# Patient Record
Sex: FEMALE | Race: WHITE | NOT HISPANIC OR LATINO | Employment: OTHER | URBAN - METROPOLITAN AREA
[De-identification: names, ages, dates, MRNs, and addresses within clinical notes are randomized per-mention and may not be internally consistent; named-entity substitution may affect disease eponyms.]

---

## 2017-01-31 DIAGNOSIS — E11.9 UNCOMPLICATED TYPE 2 DIABETES MELLITUS (H): Primary | ICD-10-CM

## 2017-01-31 NOTE — TELEPHONE ENCOUNTER
lantus solostar pen         Last Written Prescription Date: 9/22/16  Last Fill Quantity: 3, # refills: 0  Last Office Visit with [facility] [place] [place], [facility] or [facility] prescribing provider:  5/16/16        BP Readings from Last 3 Encounters:   05/16/16 138/62   11/03/15 120/70   08/06/15 112/70     MICROL        8   11/3/2015  No results found for this basename: microalbumin  CREATININE   Date Value Ref Range Status   05/16/2016 0.82 0.52 - 1.04 mg/dL Final   ]  GFR ESTIMATE   Date Value Ref Range Status   05/16/2016 71 >60 mL/min/1.7m2 Final     Comment:     Non  GFR Calc   11/03/2015 59* >60 mL/min/1.7m2 Final     Comment:     Non  GFR Calc   05/12/2015 74 >60 mL/min/1.7m2 Final     Comment:     Non  GFR Calc     GFR ESTIMATE IF BLACK   Date Value Ref Range Status   05/16/2016 85 >60 mL/min/1.7m2 Final     Comment:      GFR Calc   11/03/2015 72 >60 mL/min/1.7m2 Final     Comment:      GFR Calc   05/12/2015 89 >60 mL/min/1.7m2 Final     Comment:      GFR Calc     CHOL      161   11/3/2015  HDL       40   11/3/2015  LDL       61   11/3/2015  TRIG      301   11/3/2015  CHOLHDLRATIO      4.0   11/3/2015  AST       10   2/24/2015  ALT       23   2/24/2015  A1C      8.7   5/16/2016  A1C      8.2   11/3/2015  A1C      8.0   8/6/2015  A1C      8.5   5/12/2015  A1C      8.4   2/24/2015  POTASSIUM   Date Value Ref Range Status   05/16/2016 4.3 3.4 - 5.3 mmol/L Final

## 2017-02-01 RX ORDER — INSULIN GLARGINE 100 [IU]/ML
INJECTION, SOLUTION SUBCUTANEOUS
Qty: 3 ML | Refills: 0 | Status: SHIPPED | OUTPATIENT
Start: 2017-02-01 | End: 2017-08-25

## 2017-02-02 NOTE — TELEPHONE ENCOUNTER
Pres[place] per [facility] [place].    Signed Prescriptions:                        Disp   Refills    LANTUS SOLOSTAR 100 UNIT/ML soln           3 mL   0        Si units at bedtime. PLEASE SCHEDULE AN APPOINTMENT           TO RECEIVE FUTURE REFILLS [phone]  Authorizing Provider: MP FUNES  Ordering User: HERI TAVAREZ      Closing encounter - no further actions needed at this time    Heri Tavarez RN

## 2017-08-22 ENCOUNTER — APPOINTMENT (OUTPATIENT)
Dept: GENERAL RADIOLOGY | Facility: CLINIC | Age: 62
End: 2017-08-22
Attending: EMERGENCY MEDICINE
Payer: COMMERCIAL

## 2017-08-22 ENCOUNTER — HOSPITAL ENCOUNTER (EMERGENCY)
Facility: CLINIC | Age: 62
Discharge: HOME | End: 2017-08-23
Attending: EMERGENCY MEDICINE | Admitting: EMERGENCY MEDICINE
Payer: COMMERCIAL

## 2017-08-22 DIAGNOSIS — I31.9 PERICARDITIS, UNSPECIFIED CHRONICITY, UNSPECIFIED TYPE: ICD-10-CM

## 2017-08-22 LAB
ALBUMIN SERPL-MCNC: 3 G/DL (ref 3.4–5)
ALP SERPL-CCNC: 75 U/L (ref 40–150)
ALT SERPL W P-5'-P-CCNC: 19 U/L (ref 0–50)
ANION GAP SERPL CALCULATED.3IONS-SCNC: 12 MMOL/L (ref 3–14)
AST SERPL W P-5'-P-CCNC: 10 U/L (ref 0–45)
BASOPHILS # BLD AUTO: 0 10E9/L (ref 0–0.2)
BASOPHILS NFR BLD AUTO: 0.2 %
BILIRUB SERPL-MCNC: 0.4 MG/DL (ref 0.2–1.3)
BUN SERPL-MCNC: 23 MG/DL (ref 7–30)
CALCIUM SERPL-MCNC: 9.9 MG/DL (ref 8.5–10.1)
CHLORIDE SERPL-SCNC: 100 MMOL/L (ref 94–109)
CO2 SERPL-SCNC: 22 MMOL/L (ref 20–32)
CREAT SERPL-MCNC: 0.92 MG/DL (ref 0.52–1.04)
D DIMER PPP FEU-MCNC: <0.3 UG/ML FEU (ref 0–0.5)
DIFFERENTIAL METHOD BLD: ABNORMAL
EOSINOPHIL # BLD AUTO: 0 10E9/L (ref 0–0.7)
EOSINOPHIL NFR BLD AUTO: 0.1 %
ERYTHROCYTE [DISTWIDTH] IN BLOOD BY AUTOMATED COUNT: 13.4 % (ref 10–15)
GFR SERPL CREATININE-BSD FRML MDRD: 62 ML/MIN/1.7M2
GLUCOSE SERPL-MCNC: 295 MG/DL (ref 70–99)
HCT VFR BLD AUTO: 34.1 % (ref 35–47)
HGB BLD-MCNC: 11.8 G/DL (ref 11.7–15.7)
IMM GRANULOCYTES # BLD: 0 10E9/L (ref 0–0.4)
IMM GRANULOCYTES NFR BLD: 0.2 %
LIPASE SERPL-CCNC: 105 U/L (ref 73–393)
LYMPHOCYTES # BLD AUTO: 1.9 10E9/L (ref 0.8–5.3)
LYMPHOCYTES NFR BLD AUTO: 15.1 %
MCH RBC QN AUTO: 30.4 PG (ref 26.5–33)
MCHC RBC AUTO-ENTMCNC: 34.6 G/DL (ref 31.5–36.5)
MCV RBC AUTO: 88 FL (ref 78–100)
MONOCYTES # BLD AUTO: 0.8 10E9/L (ref 0–1.3)
MONOCYTES NFR BLD AUTO: 6.1 %
NEUTROPHILS # BLD AUTO: 10.1 10E9/L (ref 1.6–8.3)
NEUTROPHILS NFR BLD AUTO: 78.3 %
NRBC # BLD AUTO: 0 10*3/UL
NRBC BLD AUTO-RTO: 0 /100
PLATELET # BLD AUTO: 252 10E9/L (ref 150–450)
POTASSIUM SERPL-SCNC: 3.8 MMOL/L (ref 3.4–5.3)
PROT SERPL-MCNC: 7.2 G/DL (ref 6.8–8.8)
RBC # BLD AUTO: 3.88 10E12/L (ref 3.8–5.2)
SODIUM SERPL-SCNC: 134 MMOL/L (ref 133–144)
TROPONIN I SERPL-MCNC: <0.015 UG/L (ref 0–0.04)
WBC # BLD AUTO: 12.8 10E9/L (ref 4–11)

## 2017-08-22 PROCEDURE — 25000132 ZZH RX MED GY IP 250 OP 250 PS 637: Performed by: EMERGENCY MEDICINE

## 2017-08-22 PROCEDURE — 85025 COMPLETE CBC W/AUTO DIFF WBC: CPT | Performed by: EMERGENCY MEDICINE

## 2017-08-22 PROCEDURE — 96374 THER/PROPH/DIAG INJ IV PUSH: CPT

## 2017-08-22 PROCEDURE — 25000125 ZZHC RX 250: Performed by: EMERGENCY MEDICINE

## 2017-08-22 PROCEDURE — 71020 XR CHEST 2 VW: CPT

## 2017-08-22 PROCEDURE — 80053 COMPREHEN METABOLIC PANEL: CPT | Performed by: EMERGENCY MEDICINE

## 2017-08-22 PROCEDURE — 83690 ASSAY OF LIPASE: CPT | Performed by: EMERGENCY MEDICINE

## 2017-08-22 PROCEDURE — 76604 US EXAM CHEST: CPT

## 2017-08-22 PROCEDURE — 93005 ELECTROCARDIOGRAM TRACING: CPT

## 2017-08-22 PROCEDURE — 84484 ASSAY OF TROPONIN QUANT: CPT | Performed by: EMERGENCY MEDICINE

## 2017-08-22 PROCEDURE — 85379 FIBRIN DEGRADATION QUANT: CPT | Performed by: EMERGENCY MEDICINE

## 2017-08-22 PROCEDURE — 99285 EMERGENCY DEPT VISIT HI MDM: CPT | Mod: 25

## 2017-08-22 RX ORDER — ASPIRIN 81 MG/1
324 TABLET, CHEWABLE ORAL ONCE
Status: COMPLETED | OUTPATIENT
Start: 2017-08-22 | End: 2017-08-22

## 2017-08-22 RX ORDER — ALUMINA, MAGNESIA, AND SIMETHICONE 2400; 2400; 240 MG/30ML; MG/30ML; MG/30ML
30 SUSPENSION ORAL ONCE
Status: COMPLETED | OUTPATIENT
Start: 2017-08-22 | End: 2017-08-22

## 2017-08-22 RX ADMIN — ALUMINUM HYDROXIDE, MAGNESIUM HYDROXIDE, AND DIMETHICONE 30 ML: 400; 400; 40 SUSPENSION ORAL at 23:25

## 2017-08-22 RX ADMIN — ASPIRIN 81 MG 324 MG: 81 TABLET ORAL at 23:25

## 2017-08-22 RX ADMIN — LIDOCAINE HYDROCHLORIDE 10 ML: 20 SOLUTION ORAL; TOPICAL at 23:25

## 2017-08-22 NOTE — ED AVS SNAPSHOT
Emergency Department    [address] [place] [address]    Phone:  [phone]    Fax:  [phone]                                       Meera Mcgarry   MRN: [id number]    Department:   Emergency Department   Date of Visit:  8/22/2017           After Visit Summary Signature Page     I have received my discharge instructions, and my questions have been answered. I have discussed any challenges I see with this plan with the nurse or doctor.    ..........................................................................................................................................  Patient/Patient Representative Signature      ..........................................................................................................................................  Patient Representative Print Name and Relationship to Patient    ..................................................               ................................................  Date                                            Time    ..........................................................................................................................................  Reviewed by Signature/Title    ...................................................              ..............................................  Date                                                            Time

## 2017-08-22 NOTE — ED AVS SNAPSHOT
Emergency Department    [address] [facility] [address]    Phone:  [phone]    Fax:  [phone]                                       Meera Mcgarry   MRN: [id number]    Department:   Emergency Department   Date of Visit:  8/22/2017           Patient Information     Date Of Birth          1955        Your diagnoses for this visit were:     Pericarditis, unspecified chronicity, unspecified type        You were seen by Aleks Mon MD.      Follow-up Information     Follow up with  Emergency Department.    Specialty:  EMERGENCY MEDICINE    Why:  As needed    Contact information:    [address] [facility] [address] [phone]        Follow up with Angella Jones DO In 2 days.    Specialty:  Internal Medicine    Contact information:    [facility]  [address] [facility] [address] [phone]          Discharge Instructions       Take the below medications as prescribed.  Please do not miss any doses.    New Prescriptions    COLCHICINE 0.6 MG CAPS    Take 0.6 mg by mouth 2 times daily for 14 days    IBUPROFEN (ADVIL/MOTRIN) 800 MG TABLET    Take 1 tablet (800 mg) by mouth every 8 hours for 14 days    OMEPRAZOLE (PRILOSEC) 20 MG CR CAPSULE    Take 1 capsule (20 mg) by mouth daily     1. Please follow-up with your primary doctor in 1-2 days for recheck.  2. Please return to the emergency department as needed for worsening chest pain, shortness of breath, fever greater than 100.4 F, fainting, any other concerning symptoms.      Discharge References/Attachments     PERICARDITIS (ENGLISH)      24 Hour Appointment Hotline       To make an appointment at any [facility] clinic, call [phone] ([phone]). If you don't have a family doctor or clinic, we will help you find one. [facility] clinics are conveniently located to serve the needs of you and your family.             Review of your medicines      START taking        Dose /  Directions Last dose taken    Colchicine 0.6 MG Caps   Dose:  0.6 mg   Quantity:  28 capsule        Take 0.6 mg by mouth 2 times daily for 14 days   Refills:  0        ibuprofen 800 MG tablet   Commonly known as:  ADVIL/MOTRIN   Dose:  800 mg   Quantity:  42 tablet        Take 1 tablet (800 mg) by mouth every 8 hours for 14 days   Refills:  0        omeprazole 20 MG CR capsule   Commonly known as:  priLOSEC   Dose:  20 mg   Quantity:  30 capsule        Take 1 capsule (20 mg) by mouth daily   Refills:  0          Our records show that you are taking the medicines listed below. If these are incorrect, please call your family doctor or clinic.        Dose / Directions Last dose taken    albuterol 108 (90 BASE) MCG/ACT Inhaler   Commonly known as:  PROAIR HFA/PROVENTIL HFA/VENTOLIN HFA   Dose:  2 puff   Quantity:  1 Inhaler        Inhale 2 puffs into the lungs every 6 hours as needed for shortness of breath / dyspnea or wheezing   Refills:  0        aspirin 81 MG tablet        Take by mouth daily   Refills:  0        atorvastatin 20 MG tablet   Commonly known as:  LIPITOR   Dose:  20 mg   Quantity:  90 tablet        Take 1 tablet (20 mg) by mouth daily   Refills:  0        B-12 1000 MCG Tbcr   Dose:  1000 mcg   Quantity:  100 tablet        Take 1,000 mcg by mouth daily   Refills:  1        B-D U/F 31G X 8 MM   Quantity:  100 each   Generic drug:  insulin pen needle        USE 2 TIMES DAILY OR AS DIRECTED   Refills:  0        blood glucose monitoring lancets   Quantity:  4 Box        Up to four lancets per day or as directed.   Refills:  0        blood glucose monitoring test strip   Commonly known as:  ACCU-CHEK SMARTVIEW   Quantity:  100 each        To check glucose four times per day   Refills:  3        calcium-vitamin D 600-400 MG-UNIT per tablet   Commonly known as:  CALTRATE   Dose:  1 tablet        Take 1 tablet by mouth 2 times daily   Refills:  0        glimepiride 4 MG tablet   Commonly known as:  AMARYL    Dose:  8 mg   Quantity:  30 tablet        Take 2 tablets (8 mg) by mouth every morning (before breakfast)   Refills:  0        hydrochlorothiazide 25 MG tablet   Commonly known as:  HYDRODIURIL   Dose:  25 mg   Quantity:  90 tablet        Take 1 tablet (25 mg) by mouth daily   Refills:  0        ICAPS AREDS FORMULA PO        Refills:  0        LANTUS SOLOSTAR 100 UNIT/ML injection   Quantity:  3 mL   Generic drug:  insulin glargine        33 units at bedtime. PLEASE SCHEDULE AN APPOINTMENT TO RECEIVE FUTURE REFILLS [phone]   Refills:  0        levothyroxine 137 MCG tablet   Commonly known as:  SYNTHROID/LEVOTHROID   Dose:  137 mcg   Quantity:  90 tablet        Take 1 tablet (137 mcg) by mouth daily   Refills:  2        lisinopril 10 MG tablet   Commonly known as:  PRINIVIL/ZESTRIL   Dose:  10 mg   Quantity:  90 tablet        Take 1 tablet (10 mg) by mouth daily   Refills:  1        VICTOZA PEN 18 MG/3ML soln   Quantity:  27 mL   Generic drug:  liraglutide        INJECT 1.8 MG UNDER THE SKIN DAILY   Refills:  0                Prescriptions were sent or printed at these locations (3 Prescriptions)                   Other Prescriptions                Printed at Department/Unit printer (3 of 3)         ibuprofen (ADVIL/MOTRIN) 800 MG tablet               omeprazole (PRILOSEC) 20 MG CR capsule               Colchicine 0.6 MG CAPS                Procedures and tests performed during your visit     Procedure/Test Number of Times Performed    CBC with platelets differential 1    CT Chest Pulmonary Embolism w Contrast 1    Cardiac Continuous Monitoring 1    Comprehensive metabolic panel 1    D dimer quantitative 1    EKG 12-lead, tracing only 1    Lipase 1    Peripheral IV: Standard 1    Pulse oximetry nursing 1    Troponin I 2    XR Chest 2 Views 1      Orders Needing Specimen Collection     None      Pending Results     No orders found for last 3 day(s).            Pending Culture Results     No orders found for  last 3 day(s).            Pending Results Instructions     If you had any lab results that were not finalized at the time of your Discharge, you can call the ED Lab Result RN at [phone]. You will be contacted by this team for any positive Lab results or changes in treatment. The nurses are available 7 days a week from 10A to 6:30P.  You can leave a message 24 hours per day and they will return your call.        Test Results From Your Hospital Stay        8/22/2017 10:57 PM      Component Results     Component Value Ref Range & Units Status    WBC 12.8 (H) 4.0 - 11.0 10e9/L Final    RBC Count 3.88 3.8 - 5.2 10e12/L Final    Hemoglobin 11.8 11.7 - 15.7 g/dL Final    Hematocrit 34.1 (L) 35.0 - 47.0 % Final    MCV 88 78 - 100 fl Final    MCH 30.4 26.5 - 33.0 pg Final    MCHC 34.6 31.5 - 36.5 g/dL Final    RDW 13.4 10.0 - 15.0 % Final    Platelet Count 252 150 - 450 10e9/L Final    Diff Method Automated Method  Final    % Neutrophils 78.3 % Final    % Lymphocytes 15.1 % Final    % Monocytes 6.1 % Final    % Eosinophils 0.1 % Final    % Basophils 0.2 % Final    % Immature Granulocytes 0.2 % Final    Nucleated RBCs 0 0 /100 Final    Absolute Neutrophil 10.1 (H) 1.6 - 8.3 10e9/L Final    Absolute Lymphocytes 1.9 0.8 - 5.3 10e9/L Final    Absolute Monocytes 0.8 0.0 - 1.3 10e9/L Final    Absolute Eosinophils 0.0 0.0 - 0.7 10e9/L Final    Absolute Basophils 0.0 0.0 - 0.2 10e9/L Final    Abs Immature Granulocytes 0.0 0 - 0.4 10e9/L Final    Absolute Nucleated RBC 0.0  Final         8/22/2017 11:17 PM      Component Results     Component Value Ref Range & Units Status    Troponin I ES <0.015 0.000 - 0.045 ug/L Final    The 99th percentile for upper reference range is 0.045 ug/L.  Troponin values   in the range of 0.045 - 0.120 ug/L may be associated with risks of adverse   clinical events.           8/22/2017 10:58 PM      Narrative     XR CHEST 2 VW   8/22/2017 10:56 PM     HISTORY: Chest pain.    COMPARISON: 6/4/2011         Impression     IMPRESSION: Normal.    MOUNIKA RODRIGUEZ MD         8/22/2017 11:17 PM      Component Results     Component Value Ref Range & Units Status    Sodium 134 133 - 144 mmol/L Final    Potassium 3.8 3.4 - 5.3 mmol/L Final    Chloride 100 94 - 109 mmol/L Final    Carbon Dioxide 22 20 - 32 mmol/L Final    Anion Gap 12 3 - 14 mmol/L Final    Glucose 295 (H) 70 - 99 mg/dL Final    Urea Nitrogen 23 7 - 30 mg/dL Final    Creatinine 0.92 0.52 - 1.04 mg/dL Final    GFR Estimate 62 >60 mL/min/1.7m2 Final    Non  GFR Calc    GFR Estimate If Black 75 >60 mL/min/1.7m2 Final    African American GFR Calc    Calcium 9.9 8.5 - 10.1 mg/dL Final    Bilirubin Total 0.4 0.2 - 1.3 mg/dL Final    Albumin 3.0 (L) 3.4 - 5.0 g/dL Final    Protein Total 7.2 6.8 - 8.8 g/dL Final    Alkaline Phosphatase 75 40 - 150 U/L Final    ALT 19 0 - 50 U/L Final    AST 10 0 - 45 U/L Final         8/22/2017 11:12 PM      Component Results     Component Value Ref Range & Units Status    Lipase 105 73 - 393 U/L Final         8/22/2017 11:34 PM      Component Results     Component Value Ref Range & Units Status    D Dimer <0.3 0.0 - 0.50 ug/ml FEU Final    This D-dimer assay is intended for use in conjunction with a clinical pretest   probability assessment model to exclude pulmonary embolism (PE) and deep   venous thrombosis (DVT) in outpatients suspected of PE or DVT. The cut-off   value is 0.5 ug/mL FEU.           8/23/2017  2:05 AM      Narrative     CT CHEST PULMONARY EMBOLISM W CONTRAST  8/23/2017 1:32 AM     HISTORY: Fever, leukocytosis, pleuritic chest pain, evaluate PE versus  PNA.    TECHNIQUE: Volumetric acquisition of the chest after the  administration of 75 mL Isovue-370 IV contrast. Radiation dose for  this scan was reduced using automated exposure control, adjustment of  the mA and/or kV according to patient size, or iterative  reconstruction technique.     COMPARISON: None.    FINDINGS: No visualized pulmonary embolism.  Mild aortic calcification.  No thoracic aortic aneurysm or dissection. Coronary artery  calcifications. Normal heart size. Trace pericardial fluid or  thickening. Small esophageal hiatal hernia. No acute infiltrates,  pleural effusions or pneumothorax. Shallow lung volumes with some  minimal subsegmental atelectasis. A few small, nonenlarged mediastinal  lymph nodes. No acute findings in the visualized upper abdomen. Mild  degenerative and hypertrophic changes in the visualized spine.        Impression     IMPRESSION:  1. No visualized pulmonary embolism.  2. Trace pericardial fluid or thickening. Coronary artery  calcifications.  3. No other acute findings.    ADIS BUSBY MD         8/23/2017  1:49 AM      Component Results     Component Value Ref Range & Units Status    Troponin I ES <0.015 0.000 - 0.045 ug/L Final    The 99th percentile for upper reference range is 0.045 ug/L.  Troponin values   in the range of 0.045 - 0.120 ug/L may be associated with risks of adverse   clinical events.                  Clinical Quality Measure: Blood Pressure Screening     Your blood pressure was checked while you were in the emergency department today. The last reading we obtained was  BP: 110/64 . Please read the guidelines below about what these numbers mean and what you should do about them.  If your systolic blood pressure (the top number) is less than 120 and your diastolic blood pressure (the bottom number) is less than 80, then your blood pressure is normal. There is nothing more that you need to do about it.  If your systolic blood pressure (the top number) is 120-139 or your diastolic blood pressure (the bottom number) is 80-89, your blood pressure may be higher than it should be. You should have your blood pressure rechecked within a year by a primary care provider.  If your systolic blood pressure (the top number) is 140 or greater or your diastolic blood pressure (the bottom number) is 90 or greater, you may  "have high blood pressure. High blood pressure is treatable, but if left untreated over time it can put you at risk for heart attack, stroke, or kidney failure. You should have your blood pressure rechecked by a primary care provider within the next 4 weeks.  If your provider in the emergency department today gave you specific instructions to follow-up with your doctor or provider even sooner than that, you should follow that instruction and not wait for up to 4 weeks for your follow-up visit.        Thank you for choosing [facility]       Thank you for choosing [facility] for your care. Our goal is always to provide you with excellent care. Hearing back from our patients is one way we can continue to improve our services. Please take a few minutes to complete the written survey that you may receive in the mail after you visit with us. Thank you!        [employer] Information     [facility] lets you send messages to your doctor, view your test results, renew your prescriptions, schedule appointments and more. To sign up, go to [email or web address] . Click on \"Log in\" on the left side of the screen, which will take you to the Welcome page. Then click on \"Sign up Now\" on the right side of the page.     You will be asked to enter the access code listed below, as well as some personal information. Please follow the directions to create your username and password.     Your access code is: [email or web address]  Expires: 2017  3:48 AM     Your access code will  in 90 days. If you need help or a new code, please call your [facility] clinic or [phone].        Care EveryWhere ID     This is your Care EveryWhere ID. This could be used by other organizations to access your [facility] medical records  [id number]        Equal Access to Services     FRANCISCO PATINO : alexus Jerome qaybta kaalmada adeegyada, waxay idiin hayaan adeeg kharash la'aan ah. So [facility] [phone].    ATENCIÓN: Si lety jean, " tiene a nolasco disposición servicios gratuitos de asistencia lingüística. Llnatalya al [phone].    We comply with applicable federal civil rights laws and [place] laws. We do not discriminate on the basis of race, color, national origin, age, disability sex, sexual orientation or gender identity.            After Visit Summary       This is your record. Keep this with you and show to your community pharmacist(s) and doctor(s) at your next visit.

## 2017-08-23 ENCOUNTER — APPOINTMENT (OUTPATIENT)
Dept: CT IMAGING | Facility: CLINIC | Age: 62
End: 2017-08-23
Attending: EMERGENCY MEDICINE
Payer: COMMERCIAL

## 2017-08-23 VITALS
DIASTOLIC BLOOD PRESSURE: 62 MMHG | HEIGHT: 67 IN | OXYGEN SATURATION: 96 % | HEART RATE: 83 BPM | SYSTOLIC BLOOD PRESSURE: 95 MMHG | BODY MASS INDEX: 32.96 KG/M2 | RESPIRATION RATE: 12 BRPM | WEIGHT: 210 LBS | TEMPERATURE: 98.9 F

## 2017-08-23 LAB
INTERPRETATION ECG - MUSE: NORMAL
TROPONIN I SERPL-MCNC: <0.015 UG/L (ref 0–0.04)

## 2017-08-23 PROCEDURE — 71260 CT THORAX DX C+: CPT

## 2017-08-23 PROCEDURE — 25000125 ZZHC RX 250: Performed by: EMERGENCY MEDICINE

## 2017-08-23 PROCEDURE — 25000128 H RX IP 250 OP 636: Performed by: EMERGENCY MEDICINE

## 2017-08-23 PROCEDURE — 84484 ASSAY OF TROPONIN QUANT: CPT | Performed by: EMERGENCY MEDICINE

## 2017-08-23 PROCEDURE — 25000132 ZZH RX MED GY IP 250 OP 250 PS 637: Performed by: EMERGENCY MEDICINE

## 2017-08-23 RX ORDER — KETOROLAC TROMETHAMINE 30 MG/ML
30 INJECTION, SOLUTION INTRAMUSCULAR; INTRAVENOUS ONCE
Status: COMPLETED | OUTPATIENT
Start: 2017-08-23 | End: 2017-08-23

## 2017-08-23 RX ORDER — IBUPROFEN 800 MG/1
800 TABLET, FILM COATED ORAL EVERY 8 HOURS
Qty: 42 TABLET | Refills: 0 | Status: ON HOLD | OUTPATIENT
Start: 2017-08-23 | End: 2017-08-28

## 2017-08-23 RX ORDER — IOPAMIDOL 755 MG/ML
75 INJECTION, SOLUTION INTRAVASCULAR ONCE
Status: COMPLETED | OUTPATIENT
Start: 2017-08-23 | End: 2017-08-23

## 2017-08-23 RX ORDER — COLCHICINE 0.6 MG/1
0.6 CAPSULE ORAL 2 TIMES DAILY
Qty: 28 CAPSULE | Refills: 0 | Status: ON HOLD | OUTPATIENT
Start: 2017-08-23 | End: 2017-08-28

## 2017-08-23 RX ORDER — COLCHICINE 0.6 MG/1
0.6 TABLET ORAL ONCE
Status: COMPLETED | OUTPATIENT
Start: 2017-08-23 | End: 2017-08-23

## 2017-08-23 RX ADMIN — KETOROLAC TROMETHAMINE 30 MG: 30 INJECTION, SOLUTION INTRAMUSCULAR at 03:02

## 2017-08-23 RX ADMIN — IOPAMIDOL 75 ML: 755 INJECTION, SOLUTION INTRAVENOUS at 01:29

## 2017-08-23 RX ADMIN — COLCHICINE 0.6 MG: 0.6 TABLET, FILM COATED ORAL at 03:14

## 2017-08-23 RX ADMIN — SODIUM CHLORIDE 98 ML: 9 INJECTION, SOLUTION INTRAVENOUS at 01:29

## 2017-08-23 ASSESSMENT — ENCOUNTER SYMPTOMS
DIZZINESS: 0
WEAKNESS: 0
BACK PAIN: 1
ABDOMINAL PAIN: 0
NAUSEA: 1
SHORTNESS OF BREATH: 1

## 2017-08-23 NOTE — ED PROVIDER NOTES
"  History     Chief Complaint:  Chest Pain    HPI   Meera Mcgarry is a 62 year old female who presents to the emergency department today for evaluation of chest pain and back pain when she was having lunch earlier today. She states that it felt like \"there was a clamp around my chest and back.\" The pain is described as a pressure type pain and at it's worse was a 9/10, but currently her pain is at a 4/10 as her pain remained constant, but did decrease. Her back pain was made worse with deep breathing, thus she complains of being short of breath and made better when she was sitting upright. She did not feel nauseous at the time of her chest pain, but did indicate that she had felt nauseated later in the day. Additionally, the patient reports that she had a fever of 101 taken at home. No abdominal pain, no weakness, no dizziness, no vision changes, no new leg swelling, and no other symptoms to report at this time.    Allergies:  Amoxicillin     Medications:    VICTOZA PEN 18 MG/3ML soln  B-D U/F 31G X 8 MM insulin pen needle  LANTUS SOLOSTAR 100 UNIT/ML soln  glimepiride (AMARYL) 4 MG tablet  hydrochlorothiazide (HYDRODIURIL) 25 MG tablet  atorvastatin (LIPITOR) 20 MG tablet  blood glucose monitoring (ACCU-CHEK SMARTVIEW) test strip  blood glucose monitoring (ACCU-CHEK FASTCLIX) lancets  levothyroxine (SYNTHROID, LEVOTHROID) 137 MCG tablet  albuterol (PROAIR HFA, PROVENTIL HFA, VENTOLIN HFA) 108 (90 BASE) MCG/ACT inhaler  lisinopril (PRINIVIL,ZESTRIL) 10 MG tablet  Multiple Vitamins-Minerals (ICAPS AREDS FORMULA PO)  calcium-vitamin D (CALTRATE) 600-400 MG-UNIT per tablet  aspirin 81 MG tablet  Cyanocobalamin (B-12) 1000 MCG TBCR    Past Medical History:    hyperlipidemia   hypertension  Hypothyroid  Pericarditis  Type II diabetes  Peripheral neuropathy    Past Surgical History:    Tibial fracture  Hysterectomy  Sling Bladder    Family History:    CAD  Cancer  HTN  hyperlipidemia   Alzheimer Disease    Social " "History:  The patient was accompanied to the ED by her sister.  Smoking Status: Never  Alcohol Use: Yes   Marital Status:  Single [1]     Review of Systems   Eyes: Negative for visual disturbance.   Respiratory: Positive for shortness of breath.    Cardiovascular: Positive for chest pain. Negative for leg swelling.   Gastrointestinal: Positive for nausea. Negative for abdominal pain.   Musculoskeletal: Positive for back pain.   Neurological: Negative for dizziness and weakness.   All other systems reviewed and are negative.    Physical Exam     Patient Vitals for the past 24 hrs:   BP Temp Temp src Pulse Heart Rate Resp SpO2 Height Weight   08/23/17 0419 95/62 - - - 75 - 96 % - -   08/23/17 0330 95/62 - - - 75 - 96 % - -   08/23/17 0230 110/64 - - - 80 - 96 % - -   08/23/17 0200 109/57 - - - 75 - 93 % - -   08/23/17 0130 102/53 - - - - - - - -   08/23/17 0115 - - - - 75 - 96 % - -   08/23/17 0045 - - - - 77 12 95 % - -   08/23/17 0030 - - - - 82 - 97 % - -   08/23/17 0000 109/57 - - - 79 13 95 % - -   08/22/17 2330 98/60 - - - 85 12 94 % - -   08/22/17 2321 104/60 - - - 90 13 96 % - -   08/22/17 2201 128/60 98.9  F (37.2  C) Oral 83 - 20 98 % 1.702 m (5' 7\") 95.3 kg (210 lb)        Physical Exam  Constitutional: Well developed, nontox appearance  Head: Atraumatic.   Mouth/Throat: Oropharynx is clear and moist.   Neck: Full ROM, no stridor  Eyes: no scleral icterus  Cardiovascular: RRR, no m/r/g,  intact distal pulses  Pulmonary/Chest: nml resp effort, Clear BS bilat, chest NT  Abdominal: ND, +BS, NT, no rebound or guarding   : no CVA tenderness bilat  Ext: WWP, no edema  Neurological: A&Ox3, symmetric facies, moves ext x4  Skin: Skin is warm and dry.   Psychiatric: Behavior is normal. Thought content normal.   Nursing note and vitals reviewed.    Emergency Department Course     ECG:  ECG taken at 2208, ECG read at 0018  Normal sinus rhythm  Cannot rule out anterior infarct, age undetermined  Abnormal ECG  Rate 95 " bpm. UT interval 146. QRS duration 86. QT/QTc 358/449. P-R-T axes 19,-17,17.     Imaging:  Radiology findings were communicated with the patient who voiced understanding of the findings.  CT Chest Pulmonary Embolism w Contrast  1. No visualized pulmonary embolism.  2. Trace pericardial fluid or thickening. Coronary artery  calcifications.  3. No other acute findings.  Reading per radiology: ADIS BUSBY MD    XR Chest 2 Views  Normal.  Reading per radiology: MOUNIKA RODRIGUEZ MD    Bedside Ultrasound Non-radiology  [facility] Procedure Note      Limited Bedside ED Cardiac Ultrasound:     PROCEDURE: PERFORMED BY: Dr. Aleks Mon  INDICATIONS/SYMPTOM:  Chest Pain and Shortness of Breath  PROBE: Cardiac phased array probe  BODY LOCATION: Chest  FINDINGS:   The ultrasound was performed utilizing the subcostal, parasternal long axis, parasternal short axis and apical 4 chamber views.  Cardiac contractility:  Present  Gross estimation of cardiac kinesis: normal  Pericardial Effusion:  trace    INTERPRETATION:    Chamber size and motion were grossly normal with LV > RV, normal cardiac kinesis.  Trace pericardial effusion was found.  IVC visualized and findings indicate   IMAGE DOCUMENTATION: Images were archived to hard drive.          Trace pericardial effusion.  Aleks Mon MD    Laboratory:  Laboratory findings were communicated with the patient who voiced understanding of the findings.  Troponin (Collected 2242): <0.015  Troponin (Collected 0048): <0.015  D Dimer (Collected 2242): <0.3  Lipase: 105   CBC: WBC 12.8 (H), HGB 11.8,   CMP: Albumin 3.0 (L), Glucose 295 (H), o/w WNL (Creatinine 0.92)    Interventions:  2325 Aspirin 324 mg PO  2325 Maalox 30 mL PO  2325 Xylocaine 10 mL Throat  0302 Toradol 30 mg IV  0314 Colchicine 0.6 mg PO     Emergency Department Course:  Nursing notes and vitals reviewed.  2306 I entered the room.  2311 I performed an exam of the patient as documented  above.   IV was inserted and blood was drawn for laboratory testing, results above.  The patient was sent for an x-ray and CT scan while in the emergency department, results above.    The patient received the above intervention(s).    0008 the patient was rechecked and was updated on the results of her laboratory and imaging studies.    0201 I performed a bedside ultrasound, see findings above.  0318 the patient was rechecked.      I discussed the treatment plan with the patient. They expressed understanding of this plan and consented to discharge. They will be discharged home with instructions for care and follow up. In addition, the patient will return to the emergency department if their symptoms persist, worsen, if new symptoms arise or if there is any concern.  All questions were answered.     Impression & Plan      Medical Decision Making:  Meera Mcgarry is a 62 year old female who presents to the emergency department today for evaluation of chest pain.     Differential diagnosis includes pulmonary embolism, pneumothorax, pnumonia, pericarditis. Given symptom etiology consistent of pleuritic chest pain and fever, seeming less likely ACS. ECG was unchanged from previous and Troponin negative times two. The patient had mild leukocytosis on lab review. X-ray negative for pneumonia and pulmonary embolism. Given the patient's pleuritic chest discomfort. CT pulmonary embolism protocol ordered for further evaluation of pulmonary embolism versus small, early pneumonia. CT returned with signs of trace pericardial effusion versus pericardial thickening. Given the patient's similar symptoms to the last time she had pericarditis and despite obvious findings on ECG plan is to treat for pericarditis. Prescriptions given for Colchicine as well as ibuprofen. Bedside ultrasound with trace pericardial effusion. Recommendations given regarding follow up with primary care physician in two days for recheck. The patient will  return to the ED with any worrisome symptoms. The patient demonstrated understanding and was agreeable with this plan.    Diagnosis:    ICD-10-CM   1. Pericarditis, unspecified chronicity, unspecified type I31.9     Disposition:   The patient was discharged to home with the below medications to be taken at home as instructed.     Discharge Medications:  New Prescriptions    COLCHICINE 0.6 MG CAPS    Take 0.6 mg by mouth 2 times daily for 14 days    IBUPROFEN (ADVIL/MOTRIN) 800 MG TABLET    Take 1 tablet (800 mg) by mouth every 8 hours for 14 days    OMEPRAZOLE (PRILOSEC) 20 MG CR CAPSULE    Take 1 capsule (20 mg) by mouth daily     Scribe Disclosure:  IInder, am serving as a scribe at 10:48 PM on 8/22/2017 to document services personally performed by Aleks Mon MD, based on my observations and the provider's statements to me.    EMERGENCY DEPARTMENT     Aleks Mon MD  08/23/17 0600

## 2017-08-23 NOTE — DISCHARGE INSTRUCTIONS
Take the below medications as prescribed.  Please do not miss any doses.    New Prescriptions    COLCHICINE 0.6 MG CAPS    Take 0.6 mg by mouth 2 times daily for 14 days    IBUPROFEN (ADVIL/MOTRIN) 800 MG TABLET    Take 1 tablet (800 mg) by mouth every 8 hours for 14 days    OMEPRAZOLE (PRILOSEC) 20 MG CR CAPSULE    Take 1 capsule (20 mg) by mouth daily     1. Please follow-up with your primary doctor in 1-2 days for recheck.  2. Please return to the emergency department as needed for worsening chest pain, shortness of breath, fever greater than 100.4 F, fainting, any other concerning symptoms.

## 2017-08-25 ENCOUNTER — HOSPITAL ENCOUNTER (INPATIENT)
Facility: CLINIC | Age: 62
LOS: 3 days | Discharge: HOME | DRG: 316 | End: 2017-08-28
Attending: EMERGENCY MEDICINE | Admitting: INTERNAL MEDICINE
Payer: COMMERCIAL

## 2017-08-25 ENCOUNTER — APPOINTMENT (OUTPATIENT)
Dept: GENERAL RADIOLOGY | Facility: CLINIC | Age: 62
DRG: 316 | End: 2017-08-25
Attending: EMERGENCY MEDICINE
Payer: COMMERCIAL

## 2017-08-25 DIAGNOSIS — I31.39 PERICARDIAL EFFUSION: Primary | ICD-10-CM

## 2017-08-25 DIAGNOSIS — I30.0 ACUTE IDIOPATHIC PERICARDITIS: ICD-10-CM

## 2017-08-25 DIAGNOSIS — I30.1 ACUTE INFECTIVE PERICARDITIS, UNSPECIFIED INFECTIOUS ETIOLOGY: ICD-10-CM

## 2017-08-25 LAB
ALBUMIN SERPL-MCNC: 3 G/DL (ref 3.4–5)
ALP SERPL-CCNC: 74 U/L (ref 40–150)
ALT SERPL W P-5'-P-CCNC: 18 U/L (ref 0–50)
ANION GAP SERPL CALCULATED.3IONS-SCNC: 11 MMOL/L (ref 3–14)
AST SERPL W P-5'-P-CCNC: 7 U/L (ref 0–45)
BASOPHILS # BLD AUTO: 0 10E9/L (ref 0–0.2)
BASOPHILS NFR BLD AUTO: 0.1 %
BILIRUB SERPL-MCNC: 0.3 MG/DL (ref 0.2–1.3)
BUN SERPL-MCNC: 30 MG/DL (ref 7–30)
CALCIUM SERPL-MCNC: 8.7 MG/DL (ref 8.5–10.1)
CHLORIDE SERPL-SCNC: 100 MMOL/L (ref 94–109)
CO2 BLDCOV-SCNC: 24 MMOL/L (ref 21–28)
CO2 SERPL-SCNC: 24 MMOL/L (ref 20–32)
CREAT SERPL-MCNC: 1.05 MG/DL (ref 0.52–1.04)
CRP SERPL-MCNC: 53.5 MG/L (ref 0–8)
DIFFERENTIAL METHOD BLD: ABNORMAL
EOSINOPHIL # BLD AUTO: 0 10E9/L (ref 0–0.7)
EOSINOPHIL NFR BLD AUTO: 0.1 %
ERYTHROCYTE [DISTWIDTH] IN BLOOD BY AUTOMATED COUNT: 13.5 % (ref 10–15)
ERYTHROCYTE [SEDIMENTATION RATE] IN BLOOD BY WESTERGREN METHOD: 59 MM/H (ref 0–30)
GFR SERPL CREATININE-BSD FRML MDRD: 53 ML/MIN/1.7M2
GLUCOSE BLDC GLUCOMTR-MCNC: 327 MG/DL (ref 70–99)
GLUCOSE SERPL-MCNC: 420 MG/DL (ref 70–99)
HCT VFR BLD AUTO: 34.7 % (ref 35–47)
HGB BLD-MCNC: 11.8 G/DL (ref 11.7–15.7)
IMM GRANULOCYTES # BLD: 0.1 10E9/L (ref 0–0.4)
IMM GRANULOCYTES NFR BLD: 0.3 %
LACTATE BLD-SCNC: 2.1 MMOL/L (ref 0.7–2.1)
LYMPHOCYTES # BLD AUTO: 0.6 10E9/L (ref 0.8–5.3)
LYMPHOCYTES NFR BLD AUTO: 3.3 %
MCH RBC QN AUTO: 30.4 PG (ref 26.5–33)
MCHC RBC AUTO-ENTMCNC: 34 G/DL (ref 31.5–36.5)
MCV RBC AUTO: 89 FL (ref 78–100)
MONOCYTES # BLD AUTO: 0.9 10E9/L (ref 0–1.3)
MONOCYTES NFR BLD AUTO: 5.4 %
NEUTROPHILS # BLD AUTO: 15.9 10E9/L (ref 1.6–8.3)
NEUTROPHILS NFR BLD AUTO: 90.8 %
NRBC # BLD AUTO: 0 10*3/UL
NRBC BLD AUTO-RTO: 0 /100
PCO2 BLDV: 41 MM HG (ref 40–50)
PH BLDV: 7.38 PH (ref 7.32–7.43)
PLATELET # BLD AUTO: 311 10E9/L (ref 150–450)
PO2 BLDV: 34 MM HG (ref 25–47)
POTASSIUM SERPL-SCNC: 3.8 MMOL/L (ref 3.4–5.3)
PROT SERPL-MCNC: 7.3 G/DL (ref 6.8–8.8)
RBC # BLD AUTO: 3.88 10E12/L (ref 3.8–5.2)
SAO2 % BLDV FROM PO2: 64 %
SODIUM SERPL-SCNC: 135 MMOL/L (ref 133–144)
TROPONIN I SERPL-MCNC: <0.015 UG/L (ref 0–0.04)
WBC # BLD AUTO: 17.5 10E9/L (ref 4–11)

## 2017-08-25 PROCEDURE — 99223 1ST HOSP IP/OBS HIGH 75: CPT | Mod: AI | Performed by: INTERNAL MEDICINE

## 2017-08-25 PROCEDURE — 83605 ASSAY OF LACTIC ACID: CPT

## 2017-08-25 PROCEDURE — 82803 BLOOD GASES ANY COMBINATION: CPT

## 2017-08-25 PROCEDURE — 25000132 ZZH RX MED GY IP 250 OP 250 PS 637: Performed by: INTERNAL MEDICINE

## 2017-08-25 PROCEDURE — 36415 COLL VENOUS BLD VENIPUNCTURE: CPT

## 2017-08-25 PROCEDURE — 96374 THER/PROPH/DIAG INJ IV PUSH: CPT

## 2017-08-25 PROCEDURE — 80053 COMPREHEN METABOLIC PANEL: CPT | Performed by: EMERGENCY MEDICINE

## 2017-08-25 PROCEDURE — 85652 RBC SED RATE AUTOMATED: CPT | Performed by: EMERGENCY MEDICINE

## 2017-08-25 PROCEDURE — 25000131 ZZH RX MED GY IP 250 OP 636 PS 637: Performed by: INTERNAL MEDICINE

## 2017-08-25 PROCEDURE — 84484 ASSAY OF TROPONIN QUANT: CPT | Performed by: EMERGENCY MEDICINE

## 2017-08-25 PROCEDURE — 25000128 H RX IP 250 OP 636: Performed by: EMERGENCY MEDICINE

## 2017-08-25 PROCEDURE — 85025 COMPLETE CBC W/AUTO DIFF WBC: CPT | Performed by: EMERGENCY MEDICINE

## 2017-08-25 PROCEDURE — 12000000 ZZH R&B MED SURG/OB

## 2017-08-25 PROCEDURE — 93005 ELECTROCARDIOGRAM TRACING: CPT

## 2017-08-25 PROCEDURE — 00000146 ZZHCL STATISTIC GLUCOSE BY METER IP

## 2017-08-25 PROCEDURE — 71020 XR CHEST 2 VW: CPT

## 2017-08-25 PROCEDURE — 87040 BLOOD CULTURE FOR BACTERIA: CPT | Performed by: EMERGENCY MEDICINE

## 2017-08-25 PROCEDURE — 99285 EMERGENCY DEPT VISIT HI MDM: CPT | Mod: 25

## 2017-08-25 PROCEDURE — 25000125 ZZHC RX 250: Performed by: INTERNAL MEDICINE

## 2017-08-25 PROCEDURE — 36415 COLL VENOUS BLD VENIPUNCTURE: CPT | Performed by: EMERGENCY MEDICINE

## 2017-08-25 PROCEDURE — 86140 C-REACTIVE PROTEIN: CPT | Performed by: EMERGENCY MEDICINE

## 2017-08-25 RX ORDER — IBUPROFEN 400 MG/1
800 TABLET, FILM COATED ORAL EVERY 8 HOURS
Status: DISCONTINUED | OUTPATIENT
Start: 2017-08-25 | End: 2017-08-28 | Stop reason: HOSPADM

## 2017-08-25 RX ORDER — ONDANSETRON 2 MG/ML
4 INJECTION INTRAMUSCULAR; INTRAVENOUS EVERY 6 HOURS PRN
Status: DISCONTINUED | OUTPATIENT
Start: 2017-08-25 | End: 2017-08-28 | Stop reason: HOSPADM

## 2017-08-25 RX ORDER — LISINOPRIL 10 MG/1
10 TABLET ORAL DAILY
Status: DISCONTINUED | OUTPATIENT
Start: 2017-08-25 | End: 2017-08-28 | Stop reason: HOSPADM

## 2017-08-25 RX ORDER — HYDROCHLOROTHIAZIDE 25 MG/1
25 TABLET ORAL DAILY
Status: DISCONTINUED | OUTPATIENT
Start: 2017-08-26 | End: 2017-08-28 | Stop reason: HOSPADM

## 2017-08-25 RX ORDER — AMOXICILLIN 250 MG
1-2 CAPSULE ORAL 2 TIMES DAILY PRN
Status: DISCONTINUED | OUTPATIENT
Start: 2017-08-25 | End: 2017-08-28 | Stop reason: HOSPADM

## 2017-08-25 RX ORDER — COLCHICINE 0.6 MG/1
0.6 TABLET ORAL 2 TIMES DAILY
Status: DISCONTINUED | OUTPATIENT
Start: 2017-08-25 | End: 2017-08-28 | Stop reason: HOSPADM

## 2017-08-25 RX ORDER — HYDROMORPHONE HYDROCHLORIDE 1 MG/ML
0.5 INJECTION, SOLUTION INTRAMUSCULAR; INTRAVENOUS; SUBCUTANEOUS
Status: COMPLETED | OUTPATIENT
Start: 2017-08-25 | End: 2017-08-26

## 2017-08-25 RX ORDER — ONDANSETRON 4 MG/1
4 TABLET, ORALLY DISINTEGRATING ORAL EVERY 6 HOURS PRN
Status: DISCONTINUED | OUTPATIENT
Start: 2017-08-25 | End: 2017-08-28 | Stop reason: HOSPADM

## 2017-08-25 RX ORDER — LORAZEPAM 0.5 MG/1
0.5 TABLET ORAL
Status: DISCONTINUED | OUTPATIENT
Start: 2017-08-25 | End: 2017-08-28 | Stop reason: HOSPADM

## 2017-08-25 RX ORDER — DEXTROSE MONOHYDRATE 25 G/50ML
25-50 INJECTION, SOLUTION INTRAVENOUS
Status: DISCONTINUED | OUTPATIENT
Start: 2017-08-25 | End: 2017-08-28 | Stop reason: HOSPADM

## 2017-08-25 RX ORDER — NALOXONE HYDROCHLORIDE 0.4 MG/ML
.1-.4 INJECTION, SOLUTION INTRAMUSCULAR; INTRAVENOUS; SUBCUTANEOUS
Status: DISCONTINUED | OUTPATIENT
Start: 2017-08-25 | End: 2017-08-28 | Stop reason: HOSPADM

## 2017-08-25 RX ORDER — LIRAGLUTIDE 6 MG/ML
0.6 INJECTION SUBCUTANEOUS DAILY
Status: DISCONTINUED | OUTPATIENT
Start: 2017-08-25 | End: 2017-08-27

## 2017-08-25 RX ORDER — ACETAMINOPHEN 325 MG/1
650 TABLET ORAL EVERY 4 HOURS PRN
Status: DISCONTINUED | OUTPATIENT
Start: 2017-08-25 | End: 2017-08-28 | Stop reason: HOSPADM

## 2017-08-25 RX ORDER — GLIMEPIRIDE 1 MG/1
4 TABLET ORAL
Status: DISCONTINUED | OUTPATIENT
Start: 2017-08-26 | End: 2017-08-27

## 2017-08-25 RX ORDER — ATORVASTATIN CALCIUM 20 MG/1
20 TABLET, FILM COATED ORAL DAILY
Status: DISCONTINUED | OUTPATIENT
Start: 2017-08-26 | End: 2017-08-28 | Stop reason: HOSPADM

## 2017-08-25 RX ORDER — NICOTINE POLACRILEX 4 MG
15-30 LOZENGE BUCCAL
Status: DISCONTINUED | OUTPATIENT
Start: 2017-08-25 | End: 2017-08-28 | Stop reason: HOSPADM

## 2017-08-25 RX ORDER — HYDROCODONE BITARTRATE AND ACETAMINOPHEN 5; 325 MG/1; MG/1
1-2 TABLET ORAL EVERY 4 HOURS PRN
Status: DISCONTINUED | OUTPATIENT
Start: 2017-08-25 | End: 2017-08-28 | Stop reason: HOSPADM

## 2017-08-25 RX ADMIN — LISINOPRIL 10 MG: 10 TABLET ORAL at 22:58

## 2017-08-25 RX ADMIN — LIRAGLUTIDE 0.6 MG: 6 INJECTION SUBCUTANEOUS at 23:56

## 2017-08-25 RX ADMIN — HYDROCODONE BITARTRATE AND ACETAMINOPHEN 2 TABLET: 5; 325 TABLET ORAL at 22:58

## 2017-08-25 RX ADMIN — INSULIN GLARGINE 25 UNITS: 100 INJECTION, SOLUTION SUBCUTANEOUS at 23:56

## 2017-08-25 RX ADMIN — IBUPROFEN 800 MG: 600 TABLET ORAL at 22:57

## 2017-08-25 RX ADMIN — HYDROMORPHONE HYDROCHLORIDE 0.5 MG: 1 INJECTION, SOLUTION INTRAMUSCULAR; INTRAVENOUS; SUBCUTANEOUS at 18:41

## 2017-08-25 ASSESSMENT — ENCOUNTER SYMPTOMS
BACK PAIN: 1
SHORTNESS OF BREATH: 1
FEVER: 1

## 2017-08-25 NOTE — IP AVS SNAPSHOT
[facility]    [address] [place] [address]., [address]    [place] [address]    Phone:  [phone]                                       After Visit Summary   8/25/2017    Meera Mcgarry    MRN: [id number]           After Visit Summary Signature Page     I have received my discharge instructions, and my questions have been answered. I have discussed any challenges I see with this plan with the nurse or doctor.    ..........................................................................................................................................  Patient/Patient Representative Signature      ..........................................................................................................................................  Patient Representative Print Name and Relationship to Patient    ..................................................               ................................................  Date                                            Time    ..........................................................................................................................................  Reviewed by Signature/Title    ...................................................              ..............................................  Date                                                            Time

## 2017-08-25 NOTE — ED PROVIDER NOTES
History     Chief Complaint:  Chest Pain and Shortness of Breath     HPI   Meera Mcgarry is a 62 year old female with a history of pericarditis, hypertension, hyperlipidemia, and type II diabetes mellitus who presents for evaluation of chest pain and shortness of breath. The patient reports a history of pericarditis requiring previous hospitalization several decades ago. On 8/22/2017, the patient started to develop chest pain that is significantly exacerbated with deep breathing, shortness of breath, and back pain. That day, the patient was seen in the ED by Dr. Mon and had a workup concerning for pericarditis, and she was discharged with Colchicine, Ibuprofen, and Omeprazole. Details regarding the patient's labs and imaging studies from this visit are as below. Following discharge, the patient reports that she felt improved and she continued to feel better until earlier today when she started to develop recurrent chest pain and shortness of breath as well as a fever that has been as high as 101 at home. Due to her recurrent symptoms, the patient returns to the ED for reevaluation. Currently in the ED, the patient rates her pain at a severity of 9/10, and she notes that it is worse when laying back or with deep breathing. She has not had any leg swelling or pain in association with her current symptoms.     Labs 8/22/2017:  Troponin (Collected 2242): <0.015  Troponin (Collected 0048): <0.015  D Dimer (Collected 2242): <0.3  Lipase: 105   CBC: WBC 12.8 (H), HGB 11.8,   CMP: Albumin 3.0 (L), Glucose 295 (H), o/w WNL (Creatinine 0.92)    Imaging 8/22/2017:  CT Chest Pulmonary Embolism w Contrast:  1. No visualized pulmonary embolism.  2. Trace pericardial fluid or thickening. Coronary artery calcifications.  3. No other acute findings.     XR Chest 2 Views:  Normal.    Limited Bedside ED Cardiac Ultrasound:  PROCEDURE: PERFORMED BY: Dr. Aleks Mon  INDICATIONS/SYMPTOM:  Chest Pain and Shortness  of Breath  PROBE: Cardiac phased array probe  BODY LOCATION: Chest  FINDINGS:   The ultrasound was performed utilizing the subcostal, parasternal long axis, parasternal short axis and apical 4 chamber views.  Cardiac contractility:  Present  Gross estimation of cardiac kinesis: normal  Pericardial Effusion:  trace  INTERPRETATION:    Chamber size and motion were grossly normal with LV > RV, normal cardiac kinesis.  Trace pericardial effusion was found.  IVC visualized and findings indicate   IMAGE DOCUMENTATION: Images were archived to hard drive    Allergies:  Amoxicillin      Medications:    ibuprofen (ADVIL/MOTRIN) 800 MG tablet  omeprazole (PRILOSEC) 20 MG CR capsule  Colchicine 0.6 MG CAPS  VICTOZA PEN 18 MG/3ML soln  LANTUS SOLOSTAR 100 UNIT/ML soln  glimepiride (AMARYL) 4 MG tablet  hydrochlorothiazide (HYDRODIURIL) 25 MG tablet  atorvastatin (LIPITOR) 20 MG tablet  levothyroxine (SYNTHROID, LEVOTHROID) 137 MCG tablet  albuterol (PROAIR HFA, PROVENTIL HFA, VENTOLIN HFA) 108 (90 BASE) MCG/ACT inhaler  lisinopril (PRINIVIL,ZESTRIL) 10 MG tablet  Multiple Vitamins-Minerals (ICAPS AREDS FORMULA PO)  calcium-vitamin D (CALTRATE) 600-400 MG-UNIT per tablet  aspirin 81 MG tablet  Cyanocobalamin (B-12) 1000 MCG TBCR    Past Medical History:    Encephalitis   Hyperlipidemia  Hypertension  Hypothyroid   Idiopathic peripheral neuropathy  Pericarditis   Diabetes mellitus, type II   Vitamin B12 deficiency   Vitamin D deficiency     Past Surgical History:    AFO tibial fracture rigid   Hysterectomy   Sling bladder suspension with fascia daniel     Family History:    Cancer, skin - Father  Diabetes - Sister  Hypertension - Sister   Lipids - Sister  Alzheimer disease - Mother  Suicide - father   Anxiety - Father     Social History:  Tobacco use:    Never smoker  Alcohol use:    Positive  Marital status:    Single   Accompanied to ED by:  Friend      Review of Systems   Constitutional: Positive for fever.   Respiratory:  "Positive for shortness of breath.    Cardiovascular: Positive for chest pain. Negative for leg swelling.   Musculoskeletal: Positive for back pain.   All other systems reviewed and are negative.    Physical Exam   First Vitals:  BP: 129/63  Pulse: 113  Temp: 99.2  F (37.3  C)  Resp: 20  Height: 170.2 cm (5' 7\")  Weight: 88.5 kg (195 lb)  SpO2: 96 %      Physical Exam  GENERAL: well developed, pleasant  HEAD: atraumatic  EYES: pupils reactive, extraocular muscles intact, conjunctivae normal  ENT:  mucus membranes moist  NECK:  trachea midline, normal range of motion  RESPIRATORY: no tachypnea, breath sounds clear to auscultation   CVS: normal S1/S2, no murmurs, intact distal pulses, tachycardia  ABDOMEN: soft, nontender, nondistention  MUSCULOSKELETAL: no deformities  SKIN: warm and dry, no acute rashes or ulceration  NEURO: GCS 15, cranial nerves intact, alert and oriented x3  PSYCH:  Mood/affect normal    Emergency Department Course   ECG (16:36:07):  Indication: Screening for cardiovascular disease.   Rate 112 bpm. AZ interval 142 ms. QRS duration 80 ms. QT/QTc 316/431 ms. P-R-T axes 27 27 20.   Interpretation: Sinus tachycardia, Low voltage QRS, Cannot rule out anterior infarct age undetermined, Abnormal ECG  Agree with computer interpretation. Yes   Interpreted at 1700 by Dr. Stokes.      Imaging:  Radiographic findings were communicated with the patient who voiced understanding of the findings.    XR Chest:  IMPRESSION: Clear lungs.   Per radiology.     PROCEDURE: Point of care bedside Cardiac US  PERFORMED BY: Dr. Dee Romero  INDICATIONS/SYMPTOM: Chest pain.  PROBE: Phased array cardiac   BODY LOCATION: The US was performed in the parasternal long  FINDINGS: Mild pericardial effusion seen  IMAGE DOCUMENTATION: Images were archived to the [employer] hard drive, and archived to hospital IT systems.    Laboratory:  CBC: WBC 17.5 high, o/w WNL (HGB 11.8, )  CMP: Glucose 420 high, Creatinine 1.05 high, GFR Estimate " 53 low, Albumin 3.0 low, o/w WNL   ISTAT gases lactate puja POCT: pH 7.38, pCO2 41, pO2 34, Bicarbonate 24, O2 sat 64, Lactic acid 2.1   Troponin I 1730: <0.015   CRP inflammation: 53.5 high  Erythrocyte sedimentation rate auto: 59 high   Blood culture: Pending x2     Interventions:  1841 Dilaudid 0.5 mg IV     Emergency Department Course:  Nursing notes and vitals reviewed.  1651: I performed an exam of the patient as documented above.     1858: I updated and reassessed the patient.     1927: I spoke with Dr. Headley of the cardiology service regarding patient's presentation, findings, and plan of care.    2007: I spoke with Dr. Galeas of the hospitalist service regarding patient's presentation, findings, and plan of care.    Findings and plan explained to the Patient who consents to admission. Discussed the patient with Dr. Galeas, who will admit the patient to a [facility] bed for further monitoring, evaluation, and treatment.     Impression & Plan      Medical Decision Making:  Meera Mcgarry is a 62 year old female who presents with chest pain and shortness of breath. Recent workup showed trace pericardial effusion on CT chest and minimal pericardial fluid on ultrasound. I could not find the images on the hard drive. Repeat echo was obtained and shows at least mild to moderate pericardial effusion. White count is elevated. She was given pain medication and is feeling improvement. I spoke with cardiology regarding treatment, most likely viral and will hold off on antibiotics. Blood cultures have been obtained. The patient will be admitted and get a formal echo in the morning. The patient is hemodynamically stable. I did not suspect tamponade.    Diagnosis:    ICD-10-CM   1. Acute infective pericarditis, unspecified infectious etiology I30.1       Disposition:  Admitted to Dr. Galeas.       I, Leodan Schmidt, am serving as a scribe at 4:51 PM on 8/25/2017 to document services personally performed by Dr. Stokes, based on my  observations and the provider's statements to me.     EMERGENCY DEPARTMENT       Roberto Stokes MD  08/26/17 0042

## 2017-08-25 NOTE — IP AVS SNAPSHOT
MRN:[id number]                      After Visit Summary   8/25/2017    Meera Mcgarry    MRN: [id number]           Thank you!     Thank you for choosing [facility] for your care. Our goal is always to provide you with excellent care. Hearing back from our patients is one way we can continue to improve our services. Please take a few minutes to complete the written survey that you may receive in the mail after you visit with us. Thank you!        Patient Information     Date Of Birth          1955        Designated Caregiver       Most Recent Value    Caregiver    Will someone help with your care after discharge? no [neighbor would help me]      About your hospital stay     You were admitted on:  August 25, 2017 You last received care in the:  [facility]    You were discharged on:  August 28, 2017        Reason for your hospital stay       You were hospitalized secondary to pericarditis (irritiation/ inflammation of the sac lining your heart)                  Who to Call     For medical emergencies, please call 911.  For non-urgent questions about your medical care, please call your primary care provider or clinic, [phone]          Attending Provider     Provider Specialty    Roberto Stokes MD Emergency Medicine    Sommer Galeas MD Internal Medicine    Bernardino King DO Internal Medicine       Primary Care Provider Office Phone # Fax #    Angella ROMAIN Jones [phone]       When to contact your care team       Call your primary doctor if you have any of the following: temperature greater than 100.4,  increased shortness of breath or increased pain.                  After Care Instructions     Activity       Your activity upon discharge: activity as tolerated            Diet       Follow this diet upon discharge: Orders Placed This Encounter      Combination Diet Regular Diet Adult; 7349-1024 Calories: Low Consistent CHO (3-5 CHO  units/meal)                  Follow-up Appointments     Follow-up and recommended labs and tests        Follow up with primary care provider, CHANTE RUCKER, within 7 days for hospital follow- up.  The following labs/tests are recommended: CBC, BMP.            Follow-up and recommended labs and tests        A follow-up appointment has been made for you with  Dr. Chante Jones on Thursday, September 7th at 11AM at [facility] is temporarily closed. Please call the clinic at [phone] should you need to change or cancel your appointment.                  Your next 10 appointments already scheduled     Sep 15, 2017  2:00 PM CDT   Ech Complete with [id number]   [facility] CV Echocardiography (Cardiovascular Imaging at [facility])    [address]  [address]  [place] [facility] [address] [phone]           1. Please bring or wear a comfortable two-piece outfit. 2. You may eat, drink and take your normal medicines. 3. For any questions that cannot be answered, please contact the ordering physician            Sep 18, 2017  2:00 PM CDT   Return Discharge with GRAHAM Magaña CNP   [facility] [place] ([facility])    [address]  [place] [id number] [phone]              Additional Services     Follow-Up with Cardiologist       Post hospital follow-up for pericardial effusion                  Future tests that were ordered for you     Echocardiogram       Administration of IV contrast will be tailored to this examination per the appropriate written protocol listed in the Echocardiography department Protocol Book, or by the supervising Cardiologist. This may result in an order change.    Use of contrast is at the discretion of the supervising Cardiologist.                  Further instructions from your care team       A follow-up appointment has been made for you with  Dr. Chante Jones  "on Thursday, September 7th at 11AM at [facility] is temporarily closed. Please call the clinic at [phone] should you need to change or cancel your appointment. Please bring a photo ID, insurance card, and copay.       A follow-up appointment was scheduled for you [see above].  It is very important to go to it--bring these papers and your insurance card with you.  At the visit, talk about your hospital stay.  Tell your doctor how you feel.  Show your new list of medications.  Your doctor will update records, make sure you are still doing OK, and decide if any tests or medication changes are needed.        Pending Results     Date and Time Order Name Status Description    8/25/2017 1853 POC US ECHO LIMITED In process     8/25/2017 1800 Blood culture Preliminary     8/25/2017 1702 Blood culture Preliminary             Statement of Approval     Ordered          08/28/17 8997  I have reviewed and agree with all the recommendations and orders detailed in this document.  EFFECTIVE NOW     Approved and electronically signed by:  Domingo Morgan MD             Admission Information     Date & Time Provider Department Dept. Phone    8/25/2017 Bernardino King DO [facility] [phone]      Your Vitals Were     Blood Pressure Pulse Temperature Respirations Height Weight    105/52 (BP Location: Left arm) 72 98.6  F (37  C) (Oral) 18 1.702 m (5' 7\") 95.5 kg (210 lb 8.6 oz)    Pulse Oximetry BMI (Body Mass Index)                99% 32.98 kg/m2          [email or web address] Information     [email or web address] lets you send messages to your doctor, view your test results, renew your prescriptions, schedule appointments and more. To sign up, go to [email or web address] . Click on \"Log in\" on the left side of the screen, which will take you to the Welcome page. Then click on \"Sign up Now\" on the right side of the page.     You will be asked to enter the access code listed " below, as well as some personal information. Please follow the directions to create your username and password.     Your access code is: [email or web address]  Expires: 2017  3:48 AM     Your access code will  in 90 days. If you need help or a new code, please call your [place] clinic or [phone].        Care EveryWhere ID     This is your Care EveryWhere ID. This could be used by other organizations to access your [place] medical records  [id number]        Equal Access to Services     [facility] : Hadii liam coker hadpabloo Soomaali, waaxda luqadaha, qaybta kaalmada adecatalinoyanaty, yuan small . So [facility] [place] [phone].    ATENCIÓN: Si habla español, tiene a nolasco disposición servicios gratuitos de asistencia lingüística. Llame al [phone].    We comply with applicable federal civil rights laws and [place] laws. We do not discriminate on the basis of race, color, national origin, age, disability sex, sexual orientation or gender identity.               Review of your medicines      START taking        Dose / Directions    HYDROcodone-acetaminophen 5-325 MG per tablet   Commonly known as:  NORCO   Used for:  Acute idiopathic pericarditis        Dose:  1 tablet   Take 1 tablet by mouth every 6 hours as needed for moderate to severe pain   Quantity:  25 tablet   Refills:  0         CONTINUE these medicines which have NOT CHANGED        Dose / Directions    aspirin 81 MG tablet        Dose:  81 mg   Take 81 mg by mouth daily   Refills:  0       atorvastatin 20 MG tablet   Commonly known as:  LIPITOR   Used for:  Hyperlipidemia with target LDL less than 100        Dose:  20 mg   Take 1 tablet (20 mg) by mouth daily   Quantity:  90 tablet   Refills:  0       B-12 1000 MCG Tbcr        Dose:  1000 mcg   Take 1,000 mcg by mouth daily   Quantity:  100 tablet   Refills:  1       B-D U/F 31G X 8 MM   Used for:  Uncomplicated type 2 diabetes mellitus (H)   Generic drug:  insulin pen needle         USE 2 TIMES DAILY OR AS DIRECTED   Quantity:  100 each   Refills:  0       blood glucose monitoring lancets   Used for:  Type 2 diabetes mellitus without complication, without long-term current use of insulin (H)        Up to four lancets per day or as directed.   Quantity:  4 Box   Refills:  0       blood glucose monitoring test strip   Commonly known as:  ACCU-CHEK SMARTVIEW   Used for:  Type 2 diabetes mellitus without complication, without long-term current use of insulin (H)        To check glucose four times per day   Quantity:  100 each   Refills:  3       calcium-vitamin D 600-400 MG-UNIT per tablet   Commonly known as:  CALTRATE        Dose:  1 tablet   Take 1 tablet by mouth 2 times daily   Refills:  0       Colchicine 0.6 MG Caps   Used for:  Acute idiopathic pericarditis        Dose:  0.6 mg   Take 0.6 mg by mouth 2 times daily   Quantity:  60 capsule   Refills:  0       GLIMEPIRIDE PO        Dose:  4 mg   Take 4 mg by mouth daily (with breakfast)   Refills:  0       hydrochlorothiazide 25 MG tablet   Commonly known as:  HYDRODIURIL   Used for:  Benign essential hypertension        Dose:  25 mg   Take 1 tablet (25 mg) by mouth daily   Quantity:  90 tablet   Refills:  0       ibuprofen 800 MG tablet   Commonly known as:  ADVIL/MOTRIN   Used for:  Acute idiopathic pericarditis        Dose:  800 mg   Take 1 tablet (800 mg) by mouth every 8 hours   Quantity:  60 tablet   Refills:  0       ICAPS AREDS FORMULA PO        Dose:  1 tablet   Take 1 tablet by mouth daily   Refills:  0       insulin glargine 100 UNIT/ML injection   Commonly known as:  LANTUS        Dose:  25 Units   Inject 25 Units Subcutaneous At Bedtime   Refills:  0       * LEVOTHYROXINE SODIUM PO        Dose:  175 mcg   Take 175 mcg by mouth daily 1 tablet daily x 6 days weekly, then 1/2 tablet (87.5 mcg) on Sundays   Refills:  0       * LEVOTHYROXINE SODIUM PO        Dose:  87.5 mcg   Take 87.5 mcg by mouth once a week Takes 1/2 of 175mcg  tablet once weekly on Sundays & 1 tablet 6 days weekly.   Refills:  0       lisinopril 10 MG tablet   Commonly known as:  PRINIVIL/ZESTRIL   Used for:  Hypertension goal BP (blood pressure) < 140/90        Dose:  10 mg   Take 1 tablet (10 mg) by mouth daily   Quantity:  90 tablet   Refills:  1       LORAZEPAM PO        Dose:  0.5 mg   Take 0.5 mg by mouth nightly as needed for anxiety or other (travel insomnia)   Refills:  0       omeprazole 20 MG CR capsule   Commonly known as:  priLOSEC        Dose:  20 mg   Take 1 capsule (20 mg) by mouth daily   Quantity:  30 capsule   Refills:  0       VICTOZA PEN 18 MG/3ML soln   Used for:  Uncomplicated type 2 diabetes mellitus (H)   Generic drug:  liraglutide        INJECT 1.8 MG UNDER THE SKIN DAILY   Quantity:  27 mL   Refills:  0       * Notice:  This list has 2 medication(s) that are the same as other medications prescribed for you. Read the directions carefully, and ask your doctor or other care provider to review them with you.         Where to get your medicines      These medications were sent to [facility] [place], [place] - [address]  [address], [place] [address]     Phone:  [phone]     Colchicine 0.6 MG Caps    ibuprofen 800 MG tablet         Some of these will need a paper prescription and others can be bought over the counter. Ask your nurse if you have questions.     Bring a paper prescription for each of these medications     HYDROcodone-acetaminophen 5-325 MG per tablet                Protect others around you: Learn how to safely use, store and throw away your medicines at www.disposemymeds.org.             Medication List: This is a list of all your medications and when to take them. Check marks below indicate your daily home schedule. Keep this list as a reference.      Medications           Morning Afternoon Evening Bedtime As Needed    aspirin 81 MG tablet   Take 81 mg by mouth daily                                    atorvastatin 20 MG tablet   Commonly known as:  LIPITOR   Take 1 tablet (20 mg) by mouth daily   Last time this was given:  20 mg on 8/28/2017  8:14 AM                                   B-12 1000 MCG Tbcr   Take 1,000 mcg by mouth daily                                   B-D U/F 31G X 8 MM   USE 2 TIMES DAILY OR AS DIRECTED   Generic drug:  insulin pen needle                                blood glucose monitoring lancets   Up to four lancets per day or as directed.                                blood glucose monitoring test strip   Commonly known as:  ACCU-CHEK SMARTVIEW   To check glucose four times per day                                calcium-vitamin D 600-400 MG-UNIT per tablet   Commonly known as:  CALTRATE   Take 1 tablet by mouth 2 times daily   Last time this was given:  1 tablet on 8/28/2017  8:14 AM                                      Colchicine 0.6 MG Caps   Take 0.6 mg by mouth 2 times daily                                      GLIMEPIRIDE PO   Take 4 mg by mouth daily (with breakfast)   Last time this was given:  4 mg on 8/27/2017  9:37 AM                                   hydrochlorothiazide 25 MG tablet   Commonly known as:  HYDRODIURIL   Take 1 tablet (25 mg) by mouth daily   Last time this was given:  25 mg on 8/28/2017  8:14 AM                                   HYDROcodone-acetaminophen 5-325 MG per tablet   Commonly known as:  NORCO   Take 1 tablet by mouth every 6 hours as needed for moderate to severe pain   Last time this was given:  1 tablet on 8/28/2017  8:14 AM                                ibuprofen 800 MG tablet   Commonly known as:  ADVIL/MOTRIN   Take 1 tablet (800 mg) by mouth every 8 hours   Last time this was given:  800 mg on 8/28/2017  2:25 PM                                ICAPS AREDS FORMULA PO   Take 1 tablet by mouth daily                                   insulin glargine 100 UNIT/ML injection   Commonly known as:  LANTUS   Inject 25 Units Subcutaneous At Bedtime   Last  time this was given:  22 Units on 8/27/2017 10:49 PM                                * LEVOTHYROXINE SODIUM PO   Take 175 mcg by mouth daily 1 tablet daily x 6 days weekly, then 1/2 tablet (87.5 mcg) on Sundays   Last time this was given:  175 mcg on 8/28/2017  8:17 AM                                * LEVOTHYROXINE SODIUM PO   Take 87.5 mcg by mouth once a week Takes 1/2 of 175mcg tablet once weekly on Sundays & 1 tablet 6 days weekly.   Last time this was given:  175 mcg on 8/28/2017  8:17 AM                                lisinopril 10 MG tablet   Commonly known as:  PRINIVIL/ZESTRIL   Take 1 tablet (10 mg) by mouth daily   Last time this was given:  10 mg on 8/27/2017  9:54 PM                                   LORAZEPAM PO   Take 0.5 mg by mouth nightly as needed for anxiety or other (travel insomnia)                                omeprazole 20 MG CR capsule   Commonly known as:  priLOSEC   Take 1 capsule (20 mg) by mouth daily   Last time this was given:  20 mg on 8/28/2017  8:14 AM                                   VICTOZA PEN 18 MG/3ML soln   INJECT 1.8 MG UNDER THE SKIN DAILY   Last time this was given:  0.6 mg on 8/26/2017 10:23 PM   Generic drug:  liraglutide                                   * Notice:  This list has 2 medication(s) that are the same as other medications prescribed for you. Read the directions carefully, and ask your doctor or other care provider to review them with you.

## 2017-08-26 ENCOUNTER — APPOINTMENT (OUTPATIENT)
Dept: CARDIOLOGY | Facility: CLINIC | Age: 62
DRG: 316 | End: 2017-08-26
Attending: INTERNAL MEDICINE
Payer: COMMERCIAL

## 2017-08-26 LAB
GLUCOSE BLDC GLUCOMTR-MCNC: 136 MG/DL (ref 70–99)
GLUCOSE BLDC GLUCOMTR-MCNC: 158 MG/DL (ref 70–99)
GLUCOSE BLDC GLUCOMTR-MCNC: 243 MG/DL (ref 70–99)
GLUCOSE BLDC GLUCOMTR-MCNC: 244 MG/DL (ref 70–99)
GLUCOSE BLDC GLUCOMTR-MCNC: 247 MG/DL (ref 70–99)
GLUCOSE BLDC GLUCOMTR-MCNC: 406 MG/DL (ref 70–99)
INTERPRETATION ECG - MUSE: NORMAL

## 2017-08-26 PROCEDURE — 99233 SBSQ HOSP IP/OBS HIGH 50: CPT | Performed by: INTERNAL MEDICINE

## 2017-08-26 PROCEDURE — 86039 ANTINUCLEAR ANTIBODIES (ANA): CPT | Performed by: INTERNAL MEDICINE

## 2017-08-26 PROCEDURE — 40000264 ECHO COMPLETE WITH LUMASON

## 2017-08-26 PROCEDURE — 25000131 ZZH RX MED GY IP 250 OP 636 PS 637: Performed by: INTERNAL MEDICINE

## 2017-08-26 PROCEDURE — 99222 1ST HOSP IP/OBS MODERATE 55: CPT | Mod: 25 | Performed by: INTERNAL MEDICINE

## 2017-08-26 PROCEDURE — 25000132 ZZH RX MED GY IP 250 OP 250 PS 637: Performed by: INTERNAL MEDICINE

## 2017-08-26 PROCEDURE — 00000146 ZZHCL STATISTIC GLUCOSE BY METER IP

## 2017-08-26 PROCEDURE — 93306 TTE W/DOPPLER COMPLETE: CPT | Mod: 26 | Performed by: INTERNAL MEDICINE

## 2017-08-26 PROCEDURE — 21000001 ZZH R&B HEART CARE

## 2017-08-26 PROCEDURE — 36415 COLL VENOUS BLD VENIPUNCTURE: CPT | Performed by: INTERNAL MEDICINE

## 2017-08-26 PROCEDURE — 25500064 ZZH RX 255 OP 636: Performed by: INTERNAL MEDICINE

## 2017-08-26 PROCEDURE — 86038 ANTINUCLEAR ANTIBODIES: CPT | Performed by: INTERNAL MEDICINE

## 2017-08-26 PROCEDURE — 25000128 H RX IP 250 OP 636: Performed by: EMERGENCY MEDICINE

## 2017-08-26 RX ORDER — LIDOCAINE 40 MG/G
CREAM TOPICAL
Status: DISCONTINUED | OUTPATIENT
Start: 2017-08-26 | End: 2017-08-28 | Stop reason: HOSPADM

## 2017-08-26 RX ORDER — NITROGLYCERIN 0.4 MG/1
0.4 TABLET SUBLINGUAL EVERY 5 MIN PRN
Status: DISCONTINUED | OUTPATIENT
Start: 2017-08-26 | End: 2017-08-28 | Stop reason: HOSPADM

## 2017-08-26 RX ORDER — VIT C/E/ZN/COPPR/LUTEIN/ZEAXAN 60 MG-6 MG
1 CAPSULE ORAL DAILY
Status: DISCONTINUED | OUTPATIENT
Start: 2017-08-26 | End: 2017-08-28 | Stop reason: HOSPADM

## 2017-08-26 RX ADMIN — IBUPROFEN 800 MG: 600 TABLET ORAL at 22:19

## 2017-08-26 RX ADMIN — COLCHICINE 0.6 MG: 0.6 TABLET, FILM COATED ORAL at 22:19

## 2017-08-26 RX ADMIN — HYDROMORPHONE HYDROCHLORIDE 0.5 MG: 1 INJECTION, SOLUTION INTRAMUSCULAR; INTRAVENOUS; SUBCUTANEOUS at 16:11

## 2017-08-26 RX ADMIN — Medication 1000 MCG: at 10:14

## 2017-08-26 RX ADMIN — HYDROCODONE BITARTRATE AND ACETAMINOPHEN 1 TABLET: 5; 325 TABLET ORAL at 12:57

## 2017-08-26 RX ADMIN — GLIMEPIRIDE 4 MG: 4 TABLET ORAL at 10:14

## 2017-08-26 RX ADMIN — OMEPRAZOLE 20 MG: 20 CAPSULE, DELAYED RELEASE ORAL at 08:41

## 2017-08-26 RX ADMIN — IBUPROFEN 800 MG: 600 TABLET ORAL at 14:50

## 2017-08-26 RX ADMIN — IBUPROFEN 800 MG: 600 TABLET ORAL at 05:32

## 2017-08-26 RX ADMIN — Medication 1 TABLET: at 22:19

## 2017-08-26 RX ADMIN — Medication 1 CAPSULE: at 03:06

## 2017-08-26 RX ADMIN — LISINOPRIL 10 MG: 10 TABLET ORAL at 22:42

## 2017-08-26 RX ADMIN — HYDROCODONE BITARTRATE AND ACETAMINOPHEN 1 TABLET: 5; 325 TABLET ORAL at 08:50

## 2017-08-26 RX ADMIN — LIRAGLUTIDE 0.6 MG: 6 INJECTION SUBCUTANEOUS at 22:23

## 2017-08-26 RX ADMIN — HYDROCHLOROTHIAZIDE 25 MG: 25 TABLET ORAL at 08:41

## 2017-08-26 RX ADMIN — SULFUR HEXAFLUORIDE 5 ML: KIT at 12:45

## 2017-08-26 RX ADMIN — HYDROMORPHONE HYDROCHLORIDE 0.5 MG: 1 INJECTION, SOLUTION INTRAMUSCULAR; INTRAVENOUS; SUBCUTANEOUS at 16:33

## 2017-08-26 RX ADMIN — Medication 1 TABLET: at 08:41

## 2017-08-26 RX ADMIN — Medication 1 CAPSULE: at 08:41

## 2017-08-26 RX ADMIN — INSULIN ASPART 6 UNITS: 100 INJECTION, SOLUTION INTRAVENOUS; SUBCUTANEOUS at 19:04

## 2017-08-26 RX ADMIN — INSULIN GLARGINE 32 UNITS: 100 INJECTION, SOLUTION SUBCUTANEOUS at 22:23

## 2017-08-26 RX ADMIN — COLCHICINE 0.6 MG: 0.6 TABLET, FILM COATED ORAL at 08:41

## 2017-08-26 RX ADMIN — LEVOTHYROXINE SODIUM 175 MCG: 125 TABLET ORAL at 08:51

## 2017-08-26 RX ADMIN — HYDROCODONE BITARTRATE AND ACETAMINOPHEN 2 TABLET: 5; 325 TABLET ORAL at 17:18

## 2017-08-26 RX ADMIN — HYDROCODONE BITARTRATE AND ACETAMINOPHEN 1 TABLET: 5; 325 TABLET ORAL at 22:36

## 2017-08-26 RX ADMIN — ATORVASTATIN CALCIUM 20 MG: 20 TABLET, FILM COATED ORAL at 08:41

## 2017-08-26 NOTE — PLAN OF CARE
Problem: Goal Outcome Summary  Goal: Goal Outcome Summary  Outcome: No Change  Patient is alert and oriented.  Vitals stable.  Complains of mid upper back pain, worse with deep inspiration, scheduled ibuprofen helpful.  Lung sounds diminished.

## 2017-08-26 NOTE — PHARMACY-ADMISSION MEDICATION HISTORY
Admission medication history interview status for the 8/25/2017  admission is complete. See EPIC admission navigator for prior to admission medications     Medication history source reliability:Good    Actions taken by pharmacist (provider contacted, etc):None     Additional medication history information not noted on PTA med list :  -Colchicine, ibuprofen, omeprazole new 8/23/17  -DM meds have been titrating down    Medication reconciliation/reorder completed by provider prior to medication history? No    Time spent in this activity: 15 min    Prior to Admission medications    Medication Sig Last Dose Taking? Auth Provider   GLIMEPIRIDE PO Take 4 mg by mouth daily (with breakfast) 8/24/2017 at am Yes Unknown, Entered By History   insulin glargine (LANTUS) 100 UNIT/ML injection Inject 25 Units Subcutaneous At Bedtime 8/24/2017 at pm Yes Unknown, Entered By History   LEVOTHYROXINE SODIUM PO Take 175 mcg by mouth daily 1 tablet daily x 6 days weekly, then 1/2 tablet (87.5 mcg) on Sundays 8/25/2017 at am Yes Unknown, Entered By History   LEVOTHYROXINE SODIUM PO Take 87.5 mcg by mouth once a week Takes 1/2 of 175mcg tablet once weekly on Sundays & 1 tablet 6 days weekly. 8/20/2017 at am Yes Unknown, Entered By History   ibuprofen (ADVIL/MOTRIN) 800 MG tablet Take 1 tablet (800 mg) by mouth every 8 hours for 14 days 8/25/2017 at 1400 Yes Aleks Mon MD   omeprazole (PRILOSEC) 20 MG CR capsule Take 1 capsule (20 mg) by mouth daily 8/24/2017 at Unknown time Yes Aleks Mon MD   Colchicine 0.6 MG CAPS Take 0.6 mg by mouth 2 times daily for 14 days 8/25/2017 at am Yes Aleks Mon MD   VICTOZA PEN 18 MG/3ML soln INJECT 1.8 MG UNDER THE SKIN DAILY 8/24/2017 at pm Yes Marly Dumas MD   hydrochlorothiazide (HYDRODIURIL) 25 MG tablet Take 1 tablet (25 mg) by mouth daily 8/24/2017 at am Yes Marly Dumas MD   atorvastatin (LIPITOR) 20 MG tablet Take 1 tablet (20 mg) by mouth daily 8/24/2017  at Unknown time Yes Marly Dumas MD   lisinopril (PRINIVIL,ZESTRIL) 10 MG tablet Take 1 tablet (10 mg) by mouth daily 8/24/2017 at Unknown time Yes Marly Dumas MD   Multiple Vitamins-Minerals (ICAPS AREDS FORMULA PO) Take 1 tablet by mouth daily  8/24/2017 at Unknown time Yes Reported, Patient   calcium-vitamin D (CALTRATE) 600-400 MG-UNIT per tablet Take 1 tablet by mouth 2 times daily 8/24/2017 at pm Yes Reported, Patient   aspirin 81 MG tablet Take 81 mg by mouth daily  8/24/2017 at am Yes Reported, Patient   Cyanocobalamin (B-12) 1000 MCG TBCR Take 1,000 mcg by mouth daily 8/24/2017 at Unknown time Yes Marly Dumas MD   LORAZEPAM PO Take 0.5 mg by mouth nightly as needed for anxiety or other (travel insomnia) More than a month at Unknown time  Unknown, Entered By History   B-D U/F 31G X 8 MM insulin pen needle USE 2 TIMES DAILY OR AS DIRECTED   Marly Dumas MD   blood glucose monitoring (ACCU-CHEK SMARTVIEW) test strip To check glucose four times per day   Marly Dumas MD   blood glucose monitoring (ACCU-CHEK FASTCLIX) lancets Up to four lancets per day or as directed.   Marly Dumas MD

## 2017-08-26 NOTE — ED NOTES
"[facility]  ED Nurse Handoff Report    ED Chief complaint: Shortness of Breath (DX with pericarditis, today increase short of breath, CP and fever)      ED Diagnosis:   Final diagnoses:   Acute infective pericarditis, unspecified infectious etiology       Code Status: Full Code    Allergies:   Allergies   Allergen Reactions     Amoxicillin        Activity level - Baseline/Home:  Independent    Activity Level - Current:   Stand with Assist     Needed?: No    Isolation: No  Infection: Not Applicable    Bariatric?: Yes    Vital Signs:   Vitals:    08/25/17 1634 08/25/17 1700   BP: 129/63 128/70   Pulse: 113    Resp: 20 16   Temp: 99.2  F (37.3  C)    TempSrc: Oral    SpO2: 96% 96%   Weight: 88.5 kg (195 lb)    Height: 1.702 m (5' 7\")        Cardiac Rhythm: ,   Cardiac  Cardiac Rhythm: Sinus tachycardia    Pain level: 0-10 Pain Scale: 9    Is this patient confused?: No    Patient Report: Initial Complaint: Patient reports she was in ED on Tues, dx with pericarditis.  Reports she is not feeling better, chest pain with breathing worse.  Fever at home reached 101 & she was instructed to return if fever over 100.  WBC has increased since last visit. WBC 17.5, lactic acid 2.1.  Focused Assessment:  Lungs clear to auscultation, denies cough.  HR sinus tachycardia.  Chest pain with breathing.    Tests Performed: EKG, Labs, blood cultures, Chest XR.   Abnormal Results:  Refer to results.   Treatments provided:  IV insertion, pain medication.     Family Comments:  Friend with patient.     OBS brochure/video discussed/provided to patient: N/A    ED Medications:   Medications   HYDROmorphone (PF) (DILAUDID) injection 0.5 mg (0.5 mg Intravenous Given 8/25/17 6114)       Drips infusing?:  No      ED NURSE PHONE NUMBER: [phone]         "

## 2017-08-26 NOTE — H&P
[facility]    History and Physical  Hospitalist       Date of Admission:  8/25/2017    Assessment & Plan   Meera Mcgarry is a 62 year old female who presents with chest pain and recurrent pericarditis    Pericarditis  Sounds infectious due to fever, blood cultures taken in ED  Will need echocardiogram tomorrow   Will continue Colchicine, watch for bacterial infection,check HERNANDO,  No history of cancer or radiation or colitis or joint pains  Or rash    Pericardial effusion is mild, will consult cardiology    Diabetes on Insulin - on Lantus, Victoza, Amaryl  Will add SSI  Obtain all records on care everywhere  Lab Results   Component Value Date    A1C 8.7 05/16/2016    A1C 8.2 11/03/2015    A1C 8.0 08/06/2015    A1C 8.5 05/12/2015    A1C 8.4 02/24/2015     *  -Hypertenison  Continue Lisinopril    Left knee pain chronic:  Will need TKA at some time    History of Pneumonia and encephalitis  No immunesuppresion    DVT Prophylaxis: Pneumatic Compression Devices  Code Status: Full Code    Disposition: Expected discharge in 2 days once work up complete.    Sommer Galeas MD    Primary Care Physician   CHANTE RUCKER    Chief Complaint   Chest pain    History is obtained from the patient and review of medical records.    History of Present Illness   Meera Mcgarry is a 62 year old female who presents with chest pain   Patient was in ED Tuesday , she thought she had pericarditis  Has had in the past in 1989  Work up that time was Negative , treated with ibuprofen.     Tuesday, she had chest pain which improved with sitting up  ER MD gave her bed side echocardiogram which showed some fluid in pericardium  No fever, or chills or URI  No lupus, joint pains, colitis  No other medications taken  No cancer history    She took colchicine and IBUPROFEN, but felt worse today  When came to ER with sister, had high fever and abnormal EKG and CRP is elevated  No cough or new symptoms   Chest pain and shortness of breath    Better upon sitting up     Patient is retired teacher, non smoker, has 4 sisters, no family history of major disease       Past Medical History    I have reviewed this patient's medical history and updated it with pertinent information if needed.   Past Medical History:   Diagnosis Date     Encephalitis 1980    from mosquito bite     Hyperlipidemia LDL goal < 100      Hypertension goal BP (blood pressure) < 140/90      Hypothyroid      Idiopathic peripheral neuropathy 5/16/2016    Evaluated by neuro and no etiology found.      Pericarditis late 1980s    viral infection     Type 2 diabetes, HbA1c goal < 7% (H)      Vitamin B12 deficiency     was recieving IM injections     Vitamin D deficiency      Past Surgical History   I have reviewed this patient's surgical history and updated it with pertinent information if needed.  Past Surgical History:   Procedure Laterality Date     C AFO TIBIAL FRACTURE RIGID  1980s     HYSTERECTOMY, PAP NO LONGER INDICATED  2000    adhesions and tumors     SLING BLADDER SUSPENSION WITH FASCIA ZARA  2007     Prior to Admission Medications   Prior to Admission Medications   Prescriptions Last Dose Informant Patient Reported? Taking?   B-D U/F 31G X 8 MM insulin pen needle   No No   Sig: USE 2 TIMES DAILY OR AS DIRECTED   Colchicine 0.6 MG CAPS 8/25/2017 at am  No Yes   Sig: Take 0.6 mg by mouth 2 times daily for 14 days   Cyanocobalamin (B-12) 1000 MCG TBCR 8/24/2017 at Unknown time  Yes Yes   Sig: Take 1,000 mcg by mouth daily   GLIMEPIRIDE PO 8/24/2017 at am  Yes Yes   Sig: Take 4 mg by mouth daily (with breakfast)   LEVOTHYROXINE SODIUM PO 8/25/2017 at am  Yes Yes   Sig: Take 175 mcg by mouth daily 1 tablet daily x 6 days weekly, then 1/2 tablet (87.5 mcg) on Sundays   LEVOTHYROXINE SODIUM PO 8/20/2017 at am  Yes Yes   Sig: Take 87.5 mcg by mouth once a week Takes 1/2 of 175mcg tablet once weekly on Sundays & 1 tablet 6 days weekly.   LORAZEPAM PO More than a month at Unknown time  Yes  No   Sig: Take 0.5 mg by mouth nightly as needed for anxiety or other (travel insomnia)   Multiple Vitamins-Minerals (ICAPS AREDS FORMULA PO) 8/24/2017 at Unknown time  Yes Yes   Sig: Take 1 tablet by mouth daily    VICTOZA PEN 18 MG/3ML soln 8/24/2017 at pm  No Yes   Sig: INJECT 1.8 MG UNDER THE SKIN DAILY   aspirin 81 MG tablet 8/24/2017 at am  Yes Yes   Sig: Take 81 mg by mouth daily    atorvastatin (LIPITOR) 20 MG tablet 8/24/2017 at Unknown time  No Yes   Sig: Take 1 tablet (20 mg) by mouth daily   blood glucose monitoring (ACCU-CHEK FASTCLIX) lancets   No No   Sig: Up to four lancets per day or as directed.   blood glucose monitoring (ACCU-CHEK SMARTVIEW) test strip   No No   Sig: To check glucose four times per day   calcium-vitamin D (CALTRATE) 600-400 MG-UNIT per tablet 8/24/2017 at pm  Yes Yes   Sig: Take 1 tablet by mouth 2 times daily   hydrochlorothiazide (HYDRODIURIL) 25 MG tablet 8/24/2017 at am  No Yes   Sig: Take 1 tablet (25 mg) by mouth daily   ibuprofen (ADVIL/MOTRIN) 800 MG tablet 8/25/2017 at 1400  No Yes   Sig: Take 1 tablet (800 mg) by mouth every 8 hours for 14 days   insulin glargine (LANTUS) 100 UNIT/ML injection 8/24/2017 at pm  Yes Yes   Sig: Inject 25 Units Subcutaneous At Bedtime   lisinopril (PRINIVIL,ZESTRIL) 10 MG tablet 8/24/2017 at Unknown time  No Yes   Sig: Take 1 tablet (10 mg) by mouth daily   omeprazole (PRILOSEC) 20 MG CR capsule 8/24/2017 at Unknown time  No Yes   Sig: Take 1 capsule (20 mg) by mouth daily      Facility-Administered Medications: None     Allergies   Allergies   Allergen Reactions     Amoxicillin      Social History   I have reviewed this patient's social history and updated it with pertinent information if needed.   Meera  reports that she has never smoked. She has never used smokeless tobacco. She reports that she drinks alcohol. She reports that she does not use illicit drugs.     Family History   I have reviewed this patient's family history and  updated it with pertinent information if needed.    Problem (# of Occurrences) Relation (Name,Age of Onset)    Alzheimer Disease (1) Mother:  2008    Anxiety Disorder (1) Father    Asthma (1) Maternal Grandfather    C.A.D. (1) Maternal Grandmother:  of MI age 54    CANCER (1) Father: skin    DIABETES (1) Sister    Hypertension (1) Sister    Lipids (1) Sister    Suicide (1) Father:         Review of Systems   The 10 point Review of Systems is negative other than noted in the HPI or here.     Physical Exam   Temp: 99.2  F (37.3  C) Temp src: Oral BP: 127/82 Pulse: 113 Heart Rate: 107 Resp: 23 SpO2: 96 %      Vital Signs with Ranges  Temp:  [99.2  F (37.3  C)] 99.2  F (37.3  C)  Pulse:  [113] 113  Heart Rate:  [103-107] 107  Resp:  [16-23] 23  BP: (127-129)/(63-82) 127/82  SpO2:  [96 %] 96 %  195 lbs 0 oz    Constitutional: Awake, alert, cooperative, no apparent distress.  Eyes: Conjunctiva and pupils examined and normal.  HEENT: Moist mucous membranes, normal dentition.  Respiratory: Clear to auscultation bilaterally, no crackles or wheezing.  Cardiovascular: Regular rate and rhythm, normal S1 and S2, and no murmur noted.  GI: Soft, non-distended, non-tender, normal bowel sounds.  Lymph/Hematologic: No anterior cervical or supraclavicular adenopathy.  Skin: No rashes, no cyanosis, no edema.  Musculoskeletal: No joint swelling, erythema or tenderness.  Neurologic: Cranial nerves 2-12 intact, normal strength and sensation.  Psychiatric: Alert, oriented to person, place and time, no obvious anxiety or depression.    Data   Data reviewed today:  I personally reviewed the EKG tracing showing KS depression and poor voltage percordial leads.    Recent Labs  Lab 17  1730 17  0048 17  2242   WBC 17.5*  --  12.8*   HGB 11.8  --  11.8   MCV 89  --  88     --  252     --  134   POTASSIUM 3.8  --  3.8   CHLORIDE 100  --  100   CO2 24  --  22   BUN 30  --  23   CR 1.05*  --  0.92    ANIONGAP 11  --  12   TRAE 8.7  --  9.9   *  --  295*   ALBUMIN 3.0*  --  3.0*   PROTTOTAL 7.3  --  7.2   BILITOTAL 0.3  --  0.4   ALKPHOS 74  --  75   ALT 18  --  19   AST 7  --  10   LIPASE  --   --  105   TROPI <0.015 <0.015 <0.015       Recent Results (from the past 24 hour(s))   XR Chest 2 Views    Narrative    CHEST TWO VIEWS  8/25/2017 5:44 PM     COMPARISON: Two view chest x-ray 8/22/2017    HISTORY: Shortness of breath    FINDINGS: The cardiac silhouette, pulmonary vasculature, lungs and  pleural spaces are within normal limits.      Impression    IMPRESSION: Clear lungs.    ALDEN GARCIA MD   POC US ECHO LIMITED    Impression    PROCEDURE: Point of care bedside Cardiac US  PERFORMED BY: Dr. Dee Romero  INDICATIONS/SYMPTOM: Chest pain.  PROBE: Phased array cardiac   BODY LOCATION: The US was performed in the parasternal long  FINDINGS: Mild pericardial effusion seen  IMAGE DOCUMENTATION: Images were archived to the US hard drive, and archived to hospital IT systems.

## 2017-08-26 NOTE — PROGRESS NOTES
[facility] [place]    Hospitalist Progress Note    Date of Service (when I saw the patient): 08/26/2017    Assessment & Plan   Meera Mcgarry is a 62 year old female who was admitted on 8/25/2017 with pericarditis.    1. Pericarditis - Patient started on ibuprofen and colchicine.  Cardiology consult appreciated.  Echo pending.  [place] for pain control.    2. DM2 - Severe hyperglycemia.  Increase Lantus to 32 units at bedtime and start ISS wiith meals in addition to carb counting insulin dosing.  Continue Amaryl and Victoza.    3. HTN - Continue lisinopril, HCTZ.    4. HL - Continue atorvastatin.    5. HT - Continue levothyroxine.    6. GERD - Continue omeprazole.    DVT Prophylaxis: Pneumatic Compression Devices  Code Status: Full Code    Disposition: Expected discharge in 1-2 days.    Candelario Valencia  Text Page (7 am to 6 pm)    Interval History   The patient is resting in bed.  Pain is improved, currently 3/10, worse with deep breaths.    -Data reviewed today: I reviewed all new labs and imaging results over the last 24 hours. I personally reviewed no images or EKG's today.    Physical Exam   Temp: 97.4  F (36.3  C) Temp src: Oral BP: 92/58 Pulse: 81 Heart Rate: 98 Resp: 18 SpO2: 93 % O2 Device: None (Room air)    Vitals:    08/25/17 1634   Weight: 88.5 kg (195 lb)     Vital Signs with Ranges  Temp:  [97.4  F (36.3  C)-99.2  F (37.3  C)] 97.4  F (36.3  C)  Pulse:  [] 81  Heart Rate:  [] 98  Resp:  [15-23] 18  BP: ()/(52-82) 92/58  SpO2:  [92 %-96 %] 93 %  I/O last 3 completed shifts:  In: 60 [P.O.:60]  Out: -     Gen: Well nourished, well developed, alert and oriented x 3, no acute distressed  HEENT: Atraumatic, normocephalic  Lungs: Clear to ausculation without wheezes, rhonchi, or rales  Heart: Regular rate and rhythm, no murmurs, gallops, or rubs  GI: Bowel sound normal, no hepatosplenomegaly or masses  Lymph: No lymphadenopathy or edema  Skin: No rashes     Medications         multivitamin  with lutein  1 capsule Oral Daily     atorvastatin  20 mg Oral Daily     calcium-vitamin D  1 tablet Oral BID     cyanocobalamin  1,000 mcg Sublingual Daily     colchicine  0.6 mg Oral BID     glimepiride (AMARYL) tablet 4 mg  4 mg Oral Daily with breakfast     hydrochlorothiazide  25 mg Oral Daily     ibuprofen  800 mg Oral Q8H     insulin glargine  25 Units Subcutaneous At Bedtime     levothyroxine (SYNTHROID/LEVOTHROID) tablet 175 mcg  175 mcg Oral Once per day on Mon Tue Wed Thu Fri Sat     [START ON 8/27/2017] levothyroxine (SYNTHROID/LEVOTHROID) half-tab 87.5 mcg  87.5 mcg Oral Weekly     lisinopril  10 mg Oral Daily     omeprazole  20 mg Oral Daily     liraglutide  0.6 mg Subcutaneous Daily     insulin aspart   Subcutaneous TID w/meals       Data     Recent Labs  Lab 08/25/17  1730 08/23/17  0048 08/22/17  2242   WBC 17.5*  --  12.8*   HGB 11.8  --  11.8   MCV 89  --  88     --  252     --  134   POTASSIUM 3.8  --  3.8   CHLORIDE 100  --  100   CO2 24  --  22   BUN 30  --  23   CR 1.05*  --  0.92   ANIONGAP 11  --  12   TRAE 8.7  --  9.9   *  --  295*   ALBUMIN 3.0*  --  3.0*   PROTTOTAL 7.3  --  7.2   BILITOTAL 0.3  --  0.4   ALKPHOS 74  --  75   ALT 18  --  19   AST 7  --  10   LIPASE  --   --  105   TROPI <0.015 <0.015 <0.015       Recent Results (from the past 24 hour(s))   XR Chest 2 Views    Narrative    CHEST TWO VIEWS  8/25/2017 5:44 PM     COMPARISON: Two view chest x-ray 8/22/2017    HISTORY: Shortness of breath    FINDINGS: The cardiac silhouette, pulmonary vasculature, lungs and  pleural spaces are within normal limits.      Impression    IMPRESSION: Clear lungs.    ALDEN GARCIA MD   POC US ECHO LIMITED    Impression    PROCEDURE: Point of care bedside Cardiac US  PERFORMED BY: Dr. Dee Romero  INDICATIONS/SYMPTOM: Chest pain.  PROBE: Phased array cardiac   BODY LOCATION: The US was performed in the parasternal long  FINDINGS: Mild pericardial effusion  seen  IMAGE DOCUMENTATION: Images were archived to the US hard drive, and archived to hospital IT systems.

## 2017-08-26 NOTE — PLAN OF CARE
Problem: Goal Outcome Summary  Goal: Goal Outcome Summary  Outcome: No Change  Admitted from ED this hima. A/Ox4. VSS, low grade fever-99.2. RA. Bedrest, up with SBA. Tolerating reg diet. C/o CP given Hydrocodone x1. Cardiology consult ordered. IV SL.

## 2017-08-26 NOTE — PLAN OF CARE
Problem: Goal Outcome Summary  Goal: Goal Outcome Summary  Outcome: No Change  A&Ox4, VSS on RA. C/o pain to mid-upper back/neck and HA rates 3-4/10, improved with Scheduled Ibuprofen and PRN Norco. Echo completed this shift, shows pericardial effusion. Cardio consulted, repeat Echo tomorrow. Up independently, voiding. Low consistent carb diet, /247. Plans for transfer to CCU today.

## 2017-08-26 NOTE — CONSULTS
[facility]    Cardiology Consultation     Date of Admission:  8/25/2017  Date of Consult (When I saw the patient): 08/26/17    Assessment & Plan   Meera Mcgarry is a 62 year old female who was admitted on 8/25/2017. I was asked to see the patient for recurrent pericarditis.    Active Problems:    Pericardial effusion    Diabetes mellitus type 2    Assessment: The patient appears symptomatically stable on colchicine and ibuprofen.  She has a transthoracic echocardiogram pending.    Plan: I will continue the current medical plan with colchicine for three months and ibuprofen for one month.  This can be reassessed in month with potential extension of either agent.    We will await the results of the transthoracic echocardiogram today.  It wouldn't be unreasonable to dismiss the patient if that appears reasonable and she is asymptomatic.  An cardiac consultation with cardiac MR could then be obtained for further outpatient follow-up.      Jorge Headley MD    Code Status    Full Code    Reason for Consult   Reason for consult: I was asked by Dr. Galeas to evaluate this patient for recurrent pericarditis.    Primary Care Physician   CHANTE RUCKER    Chief Complaint   Pleuritic chest pain    History is obtained from the patient.    History of Present Illness   Meera Mcgarry is a 62 year old female who presents with recurrent pericarditis.    The patient states she had an episode of pericarditis in 1989 which was idiopathic in nature.  She does not remember with what she was treated but this resolved spontaneously.  The patient states that this past Tuesday she had similar symptoms and presented to the emergency department.  Reportedly a bedside echocardiogram revealed trace effusion but no other abnormalities.  The patient was dismissed on colchicine and ibuprofen.    Yesterday the patient had an acute episode with fevers, chills, chest/neck/jaw pain, and a general feeling of unpleasantness.  She  reports in it in the emergency department where ultrasound revealed a slightly larger pericardial effusion however no hemodynamic instability.  She was again initiated on colchicine and ibuprofen along with opioids for pain relief.  She has not had any further exacerbations over additional symptoms.      Past Medical History   I have reviewed this patient's medical history and updated it with pertinent information if needed.   Past Medical History:   Diagnosis Date     Encephalitis 1980    from mosquito bite     Hyperlipidemia LDL goal < 100      Hypertension goal BP (blood pressure) < 140/90      Hypothyroid      Idiopathic peripheral neuropathy 5/16/2016    Evaluated by neuro and no etiology found.      Pericarditis late 1980s    viral infection     Type 2 diabetes, HbA1c goal < 7% (H)      Vitamin B12 deficiency     was recieving IM injections     Vitamin D deficiency        Past Surgical History   I have reviewed this patient's surgical history and updated it with pertinent information if needed.  Past Surgical History:   Procedure Laterality Date     C AFO TIBIAL FRACTURE RIGID  1980s     HYSTERECTOMY, PAP NO LONGER INDICATED  2000    adhesions and tumors     SLING BLADDER SUSPENSION WITH FASCIA ZARA  2007       Prior to Admission Medications   Prior to Admission Medications   Prescriptions Last Dose Informant Patient Reported? Taking?   B-D U/F 31G X 8 MM insulin pen needle   No No   Sig: USE 2 TIMES DAILY OR AS DIRECTED   Colchicine 0.6 MG CAPS 8/25/2017 at am  No Yes   Sig: Take 0.6 mg by mouth 2 times daily for 14 days   Cyanocobalamin (B-12) 1000 MCG TBCR 8/24/2017 at Unknown time  Yes Yes   Sig: Take 1,000 mcg by mouth daily   GLIMEPIRIDE PO 8/24/2017 at am  Yes Yes   Sig: Take 4 mg by mouth daily (with breakfast)   LEVOTHYROXINE SODIUM PO 8/25/2017 at am  Yes Yes   Sig: Take 175 mcg by mouth daily 1 tablet daily x 6 days weekly, then 1/2 tablet (87.5 mcg) on Sundays   LEVOTHYROXINE SODIUM PO 8/20/2017 at  am  Yes Yes   Sig: Take 87.5 mcg by mouth once a week Takes 1/2 of 175mcg tablet once weekly on Sundays & 1 tablet 6 days weekly.   LORAZEPAM PO More than a month at Unknown time  Yes No   Sig: Take 0.5 mg by mouth nightly as needed for anxiety or other (travel insomnia)   Multiple Vitamins-Minerals (ICAPS AREDS FORMULA PO) 8/24/2017 at Unknown time  Yes Yes   Sig: Take 1 tablet by mouth daily    VICTOZA PEN 18 MG/3ML soln 8/24/2017 at pm  No Yes   Sig: INJECT 1.8 MG UNDER THE SKIN DAILY   aspirin 81 MG tablet 8/24/2017 at am  Yes Yes   Sig: Take 81 mg by mouth daily    atorvastatin (LIPITOR) 20 MG tablet 8/24/2017 at Unknown time  No Yes   Sig: Take 1 tablet (20 mg) by mouth daily   blood glucose monitoring (ACCU-CHEK FASTCLIX) lancets   No No   Sig: Up to four lancets per day or as directed.   blood glucose monitoring (ACCU-CHEK SMARTVIEW) test strip   No No   Sig: To check glucose four times per day   calcium-vitamin D (CALTRATE) 600-400 MG-UNIT per tablet 8/24/2017 at pm  Yes Yes   Sig: Take 1 tablet by mouth 2 times daily   hydrochlorothiazide (HYDRODIURIL) 25 MG tablet 8/24/2017 at am  No Yes   Sig: Take 1 tablet (25 mg) by mouth daily   ibuprofen (ADVIL/MOTRIN) 800 MG tablet 8/25/2017 at 1400  No Yes   Sig: Take 1 tablet (800 mg) by mouth every 8 hours for 14 days   insulin glargine (LANTUS) 100 UNIT/ML injection 8/24/2017 at pm  Yes Yes   Sig: Inject 25 Units Subcutaneous At Bedtime   lisinopril (PRINIVIL,ZESTRIL) 10 MG tablet 8/24/2017 at Unknown time  No Yes   Sig: Take 1 tablet (10 mg) by mouth daily   omeprazole (PRILOSEC) 20 MG CR capsule 8/24/2017 at Unknown time  No Yes   Sig: Take 1 capsule (20 mg) by mouth daily      Facility-Administered Medications: None     Allergies   Allergies   Allergen Reactions     Amoxicillin        Social History   I have reviewed this patient's social history and updated it with pertinent information if needed. Meera Colmenaresrogelio  reports that she has never smoked. She has  never used smokeless tobacco. She reports that she drinks alcohol. She reports that she does not use illicit drugs.    Family History   I have reviewed this patient's family history and updated it with pertinent information if needed.   Family History   Problem Relation Age of Onset     Asthma Maternal Grandfather      OLVIN.A.D. Maternal Grandmother       of MI age 54     CANCER Father      skin     DIABETES Sister      Hypertension Sister      Lipids Sister      Alzheimer Disease Mother       2008     Suicide Father      2012     Anxiety Disorder Father        Review of Systems   The 10 point Review of Systems is negative other than noted in the HPI.    Physical Exam   Temp: 97.4  F (36.3  C) Temp src: Oral BP: 92/58 Pulse: 81 Heart Rate: 98 Resp: 18 SpO2: 93 % O2 Device: None (Room air)    Vital Signs with Ranges  Temp:  [97.4  F (36.3  C)-99.2  F (37.3  C)] 97.4  F (36.3  C)  Pulse:  [] 81  Heart Rate:  [] 98  Resp:  [15-23] 18  BP: ()/(52-82) 92/58  SpO2:  [92 %-96 %] 93 %  195 lbs 0 oz    Constitutional: Examined and normal.  Hematologic / Lymphatic: Examined and normal.  Respiratory: Shallow inspirations, however no increased work of breathing, good air exchange, clear to auscultation bilaterally, no crackles or wheezing  Cardiovascular: Precordium is quiet with normal JVP, otherwise S1/S2 examined without murmur or gallop present.  There is a faint pericardial rub appreciated.  GI: No scars, normal bowel sounds, soft, non-distended, non-tender, no masses palpated, no hepatosplenomegally  Skin: no redness, warmth, or swelling  Musculoskeletal: No lower extremity pitting edema present      Data   Most Recent 2 LFT's:  Recent Labs   Lab Test  17   1730  17   2242   AST  7  10   ALT  18  19   ALKPHOS  74  75   BILITOTAL  0.3  0.4     Most Recent 3 Creatinines:  Recent Labs   Lab Test  17   1730  17   2242  16   0913   CR  1.05*  0.92  0.82     Most Recent 3  Hemoglobins:  Recent Labs   Lab Test  08/25/17   1730  08/22/17   2242   HGB  11.8  11.8     Most Recent 3 Troponin's:  Recent Labs   Lab Test  08/25/17   1730  08/23/17   0048  08/22/17   2242   TROPI  <0.015  <0.015  <0.015     Most Recent Urinalysis:No lab results found.  Most Recent CPK:No lab results found.

## 2017-08-27 ENCOUNTER — APPOINTMENT (OUTPATIENT)
Dept: CARDIOLOGY | Facility: CLINIC | Age: 62
DRG: 316 | End: 2017-08-27
Attending: INTERNAL MEDICINE
Payer: COMMERCIAL

## 2017-08-27 LAB
GLUCOSE BLDC GLUCOMTR-MCNC: 103 MG/DL (ref 70–99)
GLUCOSE BLDC GLUCOMTR-MCNC: 113 MG/DL (ref 70–99)
GLUCOSE BLDC GLUCOMTR-MCNC: 118 MG/DL (ref 70–99)
GLUCOSE BLDC GLUCOMTR-MCNC: 143 MG/DL (ref 70–99)
GLUCOSE BLDC GLUCOMTR-MCNC: 201 MG/DL (ref 70–99)
GLUCOSE BLDC GLUCOMTR-MCNC: 65 MG/DL (ref 70–99)
GLUCOSE BLDC GLUCOMTR-MCNC: 67 MG/DL (ref 70–99)
GLUCOSE BLDC GLUCOMTR-MCNC: 79 MG/DL (ref 70–99)

## 2017-08-27 PROCEDURE — 25000132 ZZH RX MED GY IP 250 OP 250 PS 637: Performed by: INTERNAL MEDICINE

## 2017-08-27 PROCEDURE — 99231 SBSQ HOSP IP/OBS SF/LOW 25: CPT | Mod: 25 | Performed by: INTERNAL MEDICINE

## 2017-08-27 PROCEDURE — 99232 SBSQ HOSP IP/OBS MODERATE 35: CPT | Performed by: INTERNAL MEDICINE

## 2017-08-27 PROCEDURE — 93321 DOPPLER ECHO F-UP/LMTD STD: CPT | Mod: 26 | Performed by: INTERNAL MEDICINE

## 2017-08-27 PROCEDURE — 93308 TTE F-UP OR LMTD: CPT | Mod: 26 | Performed by: INTERNAL MEDICINE

## 2017-08-27 PROCEDURE — 93308 TTE F-UP OR LMTD: CPT

## 2017-08-27 PROCEDURE — 00000146 ZZHCL STATISTIC GLUCOSE BY METER IP

## 2017-08-27 PROCEDURE — 93325 DOPPLER ECHO COLOR FLOW MAPG: CPT | Mod: 26 | Performed by: INTERNAL MEDICINE

## 2017-08-27 PROCEDURE — 21000001 ZZH R&B HEART CARE

## 2017-08-27 PROCEDURE — 25000131 ZZH RX MED GY IP 250 OP 636 PS 637: Performed by: INTERNAL MEDICINE

## 2017-08-27 RX ADMIN — IBUPROFEN 800 MG: 600 TABLET ORAL at 13:33

## 2017-08-27 RX ADMIN — HYDROCODONE BITARTRATE AND ACETAMINOPHEN 1 TABLET: 5; 325 TABLET ORAL at 13:02

## 2017-08-27 RX ADMIN — Medication 1 TABLET: at 08:07

## 2017-08-27 RX ADMIN — Medication 1 TABLET: at 21:54

## 2017-08-27 RX ADMIN — OMEPRAZOLE 20 MG: 20 CAPSULE, DELAYED RELEASE ORAL at 08:07

## 2017-08-27 RX ADMIN — GLIMEPIRIDE 4 MG: 4 TABLET ORAL at 09:37

## 2017-08-27 RX ADMIN — HYDROCHLOROTHIAZIDE 25 MG: 25 TABLET ORAL at 08:07

## 2017-08-27 RX ADMIN — LISINOPRIL 10 MG: 10 TABLET ORAL at 21:54

## 2017-08-27 RX ADMIN — IBUPROFEN 800 MG: 600 TABLET ORAL at 06:28

## 2017-08-27 RX ADMIN — COLCHICINE 0.6 MG: 0.6 TABLET, FILM COATED ORAL at 08:07

## 2017-08-27 RX ADMIN — IBUPROFEN 800 MG: 600 TABLET ORAL at 21:53

## 2017-08-27 RX ADMIN — HYDROCODONE BITARTRATE AND ACETAMINOPHEN 1 TABLET: 5; 325 TABLET ORAL at 08:07

## 2017-08-27 RX ADMIN — INSULIN GLARGINE 22 UNITS: 100 INJECTION, SOLUTION SUBCUTANEOUS at 22:49

## 2017-08-27 RX ADMIN — Medication 1000 MCG: at 08:06

## 2017-08-27 RX ADMIN — HYDROCODONE BITARTRATE AND ACETAMINOPHEN 1 TABLET: 5; 325 TABLET ORAL at 21:54

## 2017-08-27 RX ADMIN — INSULIN ASPART 4 UNITS: 100 INJECTION, SOLUTION INTRAVENOUS; SUBCUTANEOUS at 08:35

## 2017-08-27 RX ADMIN — ATORVASTATIN CALCIUM 20 MG: 20 TABLET, FILM COATED ORAL at 08:06

## 2017-08-27 RX ADMIN — COLCHICINE 0.6 MG: 0.6 TABLET, FILM COATED ORAL at 21:54

## 2017-08-27 RX ADMIN — Medication 87.5 MCG: at 08:06

## 2017-08-27 RX ADMIN — Medication 1 CAPSULE: at 08:06

## 2017-08-27 RX ADMIN — INSULIN ASPART 4 UNITS: 100 INJECTION, SOLUTION INTRAVENOUS; SUBCUTANEOUS at 13:30

## 2017-08-27 RX ADMIN — HYDROCODONE BITARTRATE AND ACETAMINOPHEN 1 TABLET: 5; 325 TABLET ORAL at 17:15

## 2017-08-27 ASSESSMENT — PAIN DESCRIPTION - DESCRIPTORS
DESCRIPTORS: ACHING

## 2017-08-27 NOTE — PROGRESS NOTES
[facility]    Cardiology Progress Note    Date of Service (when I saw the patient): 08/27/2017     Assessment & Plan   Meera Mcgarry is a 62 year old female who was admitted on 8/25/2017.     Active Problems:    Pericardial effusion    Assessment: in review of the transverse echocardiogram today, the effusion is slightly larger however not causing overt hemodynamic compromise.  Patient's symptoms are stable and she is not having chest painor dyspnea on exertion. While the patient's CRP is elevated, the remainder of her inflammatory workup is pending. She does not appear toxic from a viral or bacterial effusion, either.    Plan: the plan will be to obtain another transthoracic echocardiogram tomorrow, and seek pericardial drainage sample and test the fluid.  Please keep n.p.o. After midnight.      Jorge Headley MD    Interval History   The patient notes no new symptoms.  She denies any chest pain, and she has only had a few brie of resting dyspnea.  She notes no new rashes or other symptoms.    Physical Exam   Temp: 98  F (36.7  C) Temp src: Oral BP: 97/52 Pulse: 69 Heart Rate: 72 Resp: 16 SpO2: 95 % O2 Device: None (Room air)    Vitals:    08/25/17 1634 08/27/17 0500   Weight: 88.5 kg (195 lb) 95 kg (209 lb 7 oz)     Vital Signs with Ranges  Temp:  [98  F (36.7  C)-98.6  F (37  C)] 98  F (36.7  C)  Pulse:  [69-79] 69  Heart Rate:  [71-80] 72  Resp:  [14-18] 16  BP: ()/(51-59) 97/52  SpO2:  [93 %-98 %] 95 %  I/O last 3 completed shifts:  In: 885 [P.O.:885]  Out: 20 [Urine:20]    Constitutional: Examined and normal.  Hematologic / Lymphatic: Examined and normal.  Respiratory: Shallow inspirations, however no increased work of breathing, good air exchange, clear to auscultation bilaterally, no crackles or wheezing  Cardiovascular: Precordium is quiet with Kussmaul sign on evaluation of elevated JVP (patient at 90 degrees), otherwise S1/S2 examined without murmur or gallop present.  There  is a faint pericardial rub appreciated.  GI: No scars, normal bowel sounds, soft, non-distended, non-tender, no masses palpated, no hepatosplenomegally  Skin: no redness, warmth, or swelling  Musculoskeletal: No lower extremity pitting edema present    Medications        multivitamin  with lutein  1 capsule Oral Daily     insulin glargine  32 Units Subcutaneous At Bedtime     insulin aspart  1-10 Units Subcutaneous TID AC     insulin aspart  1-7 Units Subcutaneous At Bedtime     sodium chloride (PF)  3 mL Intracatheter Q8H     atorvastatin  20 mg Oral Daily     calcium-vitamin D  1 tablet Oral BID     cyanocobalamin  1,000 mcg Sublingual Daily     colchicine  0.6 mg Oral BID     glimepiride (AMARYL) tablet 4 mg  4 mg Oral Daily with breakfast     hydrochlorothiazide  25 mg Oral Daily     ibuprofen  800 mg Oral Q8H     levothyroxine (SYNTHROID/LEVOTHROID) tablet 175 mcg  175 mcg Oral Once per day on Mon Tue Wed Thu Fri Sat     levothyroxine (SYNTHROID/LEVOTHROID) half-tab 87.5 mcg  87.5 mcg Oral Weekly     lisinopril  10 mg Oral Daily     omeprazole  20 mg Oral Daily     liraglutide  0.6 mg Subcutaneous Daily     insulin aspart   Subcutaneous TID w/meals       Data   Results for orders placed or performed during the hospital encounter of 17 (from the past 24 hour(s))   Echo Complete with Lumason    Narrative    [id number]  Formerly [facility]  [id number]  [id number]^HERNANDO^JIGNA^JOSE MARTIN           [facility]  Echocardiography Laboratory  [address] [place] [address]        Name: GURPREET DUVAL  MRN: [id number]  : 1955  Study Date: 2017 12:07 PM  Age: 62 yrs  Gender: Female  Patient Location: [facility]  Reason For Study: Pericarditis  Ordering Physician: JIGNA VILLAGOMEZ  Referring Physician: JIGNA VILLAGOMEZ  Performed By: WINSOME Dyer     BSA: 2.0 m2  Height: 67 in  Weight: 195 lb  HR: 79  BP: 92/58 mmHg  _____________________________________________________________________________  __         Procedure  Complete Portable Echo Adult. Contrast Lumason.  _____________________________________________________________________________  __        Interpretation Summary     Small and circumferential pericardial effusion noted with a free fluid  pericardial separation of < 1 cm (about 0.8 cm) but there is a layer of echo-  dense material (0.6 cm) which may represent adipose or fibrin layering (no  stranding).  Respirophasic septal motion and exaggerated mitral floe velocities were  present (just < 25% change). There was a dilated IVC (>2.5cm) with no  respiratory collapse; right atrial pressure is estimated at >20mmHg.  These findings are consistent with pericarditis and probable effusive  constrictive hemodynamics. The ventricles are small. There is not yet right  ventricular diastolic collapse but there is evidence of a hemodynamic effect  of the pericardial effusion.  Cardiology consultation is recommended. Clinical correlation is needed as is  repeat echocardiography tomorrow to include respiratory hemodynamics and  hepatic Doppler trace. Given the small amount of free fluid now present, this  effusion is unlikely to be amenable to pericardiocentesis.     There is no comparison study available.  _____________________________________________________________________________  __        Left Ventricle  The left ventricular cavity is small. There is borderline concentric left  ventricular hypertrophy. Left ventricular systolic function is normal. E by E  prime ratio is between 8 and 15, which is indeterminate for assessment of left  ventricular filling pressures. The visual ejection fraction is estimated at  60-65%. No regional wall motion abnormalities noted.     Right Ventricle  The right ventricular cavity is small. The right ventricular systolic function  is normal.     Atria  Normal left atrial size. Right atrial size is normal. There is no atrial shunt  seen.     Mitral Valve  There is trace mitral  regurgitation.        Tricuspid Valve  There is trace tricuspid regurgitation. Dilated IVC (>2.5cm) with no  respiratory collapse; right atrial pressure is estimated at >20mmHg. Right  ventricular systolic pressure could not be approximated due to inadequate  tricuspid regurgitation.     Aortic Valve  No aortic regurgitation is present. No hemodynamically significant valvular  aortic stenosis. The mean AoV pressure gradient is 7mmHg.     Pulmonic Valve  There is no pulmonic valvular stenosis.     Vessels  The ascending aorta is Borderline dilated. Above the STY ridge, it is 3.7 cm.     Pericardium  Small pericardial effusion. A circumferential pericardial effusion is noted.        Rhythm  The rhythm was normal sinus.  _____________________________________________________________________________  __  MMode/2D Measurements & Calculations  IVSd: 1.1 cm     LVIDd: 5.5 cm  LVIDs: 3.9 cm  LVPWd: 0.72 cm  FS: 29.2 %  EDV(Teich): 144.5 ml  ESV(Teich): 64.4 ml  LV mass(C)d: 185.8 grams  LV mass(C)dI: 92.9 grams/m2  Ao root diam: 2.9 cm  LA dimension: 4.0 cm  asc Aorta Diam: 3.7 cm  LA/Ao: 1.3  LVOT diam: 2.2 cm  LVOT area: 3.6 cm2  LA Volume (BP): 33.1 ml  LA Volume Index (BP): 16.6 ml/m2           Doppler Measurements & Calculations  MV E max shayla: 79.4 cm/sec  MV A max shayla: 73.7 cm/sec  MV E/A: 1.1  MV dec time: 0.13 sec  LV V1 max P.6 mmHg  LV V1 max: 118.6 cm/sec  LV V1 VTI: 23.2 cm  SV(LVOT): 84.7 ml  SI(LVOT): 42.3 ml/m2  Lateral E/e': 10.1  Medial E/e': 9.0           _____________________________________________________________________________  __           Report approved by: Heather Baez 2017 02:26 PM      Glucose by meter   Result Value Ref Range    Glucose 243 (H) 70 - 99 mg/dL   Glucose by meter   Result Value Ref Range    Glucose 247 (H) 70 - 99 mg/dL   Glucose by meter   Result Value Ref Range    Glucose 158 (H) 70 - 99 mg/dL   Glucose by meter   Result Value Ref Range    Glucose 136 (H) 70 - 99  mg/dL   Glucose by meter   Result Value Ref Range    Glucose 118 (H) 70 - 99 mg/dL   Glucose by meter   Result Value Ref Range    Glucose 103 (H) 70 - 99 mg/dL   Glucose by meter   Result Value Ref Range    Glucose 143 (H) 70 - 99 mg/dL

## 2017-08-27 NOTE — PROGRESS NOTES
[facility]    Hospitalist Progress Note    Date of Service (when I saw the patient): 08/27/2017    Assessment & Plan   Meera Mcgarry is a 62 year old female who was admitted on 8/25/2017 with pericarditis.    1. Pericarditis - Patient started on ibuprofen and colchicine.  Cardiology consult appreciated.  Norco for pain control.  Echo 8/26 demonstrates small (<1 cm) circumferential pericardial effusion with layer of echo dense material (0.6 cm), possibly fibrin layering or adipose tissue.  Possible effusive constrictive hemodynamics with respiration.  Repeat echo today with pericardial effusion slightly enlarged but no tamponade physiology.  Not likely to be amenable to pericardiocentesis due to small size.  Cardiology recommends repeat echo again tomorrow with possible pericardiocentisis.    2. DM2 - Hyperglycemia improved.  Increased Lantus to 32 units at bedtime and start ISS wiith meals in addition to carb counting insulin dosing.  Continue Amaryl and Victoza.    3. HTN - Continue lisinopril, HCTZ.    4. HL - Continue atorvastatin.    5. HT - Continue levothyroxine.    6. GERD - Continue omeprazole.    DVT Prophylaxis: Pneumatic Compression Devices  Code Status: Full Code    Disposition: Expected discharge in 1-2 days.    Candelario Valencia  Text Page (7 am to 6 pm)    Interval History   The patient is resting in bed.  Pain is improved, but feels fatigued.    -Data reviewed today: I reviewed all new labs and imaging results over the last 24 hours. I personally reviewed no images or EKG's today.    Physical Exam   Temp: 98  F (36.7  C) Temp src: Oral BP: 97/52 Pulse: 69 Heart Rate: 72 Resp: 16 SpO2: 95 % O2 Device: None (Room air)    Vitals:    08/25/17 1634 08/27/17 0500   Weight: 88.5 kg (195 lb) 95 kg (209 lb 7 oz)     Vital Signs with Ranges  Temp:  [98  F (36.7  C)-98.6  F (37  C)] 98  F (36.7  C)  Pulse:  [69-79] 69  Heart Rate:  [71-80] 72  Resp:  [14-18] 16  BP: ()/(51-59)  97/52  SpO2:  [93 %-98 %] 95 %  I/O last 3 completed shifts:  In: 885 [P.O.:885]  Out: 20 [Urine:20]    Gen: Well nourished, well developed, alert and oriented x 3, no acute distressed  HEENT: Atraumatic, normocephalic  Lungs: Clear to ausculation without wheezes, rhonchi, or rales  Heart: Regular rate and rhythm, no murmurs, gallops, or rubs  GI: Bowel sound normal, no hepatosplenomegaly or masses  Lymph: No lymphadenopathy or edema  Skin: No rashes     Medications        multivitamin  with lutein  1 capsule Oral Daily     insulin glargine  32 Units Subcutaneous At Bedtime     insulin aspart  1-10 Units Subcutaneous TID AC     insulin aspart  1-7 Units Subcutaneous At Bedtime     sodium chloride (PF)  3 mL Intracatheter Q8H     atorvastatin  20 mg Oral Daily     calcium-vitamin D  1 tablet Oral BID     cyanocobalamin  1,000 mcg Sublingual Daily     colchicine  0.6 mg Oral BID     glimepiride (AMARYL) tablet 4 mg  4 mg Oral Daily with breakfast     hydrochlorothiazide  25 mg Oral Daily     ibuprofen  800 mg Oral Q8H     levothyroxine (SYNTHROID/LEVOTHROID) tablet 175 mcg  175 mcg Oral Once per day on Mon Tue Wed Thu Fri Sat     levothyroxine (SYNTHROID/LEVOTHROID) half-tab 87.5 mcg  87.5 mcg Oral Weekly     lisinopril  10 mg Oral Daily     omeprazole  20 mg Oral Daily     liraglutide  0.6 mg Subcutaneous Daily     insulin aspart   Subcutaneous TID w/meals       Data     Recent Labs  Lab 08/25/17  1730 08/23/17  0048 08/22/17  2242   WBC 17.5*  --  12.8*   HGB 11.8  --  11.8   MCV 89  --  88     --  252     --  134   POTASSIUM 3.8  --  3.8   CHLORIDE 100  --  100   CO2 24  --  22   BUN 30  --  23   CR 1.05*  --  0.92   ANIONGAP 11  --  12   TRAE 8.7  --  9.9   *  --  295*   ALBUMIN 3.0*  --  3.0*   PROTTOTAL 7.3  --  7.2   BILITOTAL 0.3  --  0.4   ALKPHOS 74  --  75   ALT 18  --  19   AST 7  --  10   LIPASE  --   --  105   TROPI <0.015 <0.015 <0.015       No results found for this or any previous  visit (from the past 24 hour(s)).

## 2017-08-27 NOTE — PLAN OF CARE
Problem: Goal Outcome Summary  Goal: Goal Outcome Summary  Outcome: No Change  A&OX4. PRN norco given twice and scheduled ibuprofen for chest pain. Verbalized with relief. Vitals stable. TELE SR. Up independently.Limited ECHO done which showed slightly larger pericardial effusion. Plan to obtain another ECHO tomorrow and seek pericardial drainage sample and test the fluid. NPO MN. Will continue to monitor.

## 2017-08-27 NOTE — PLAN OF CARE
Problem: Goal Outcome Summary  Goal: Goal Outcome Summary  Outcome: No Change  A&Ox4. VSS overnight on RA. Tele: NSR, HR 70's. C/o of pain, 1 tablet of PRN narco given and x2 doses of ibuprofen. Up w/ stand by assist overnight. Blood sugars stable. Order parameters not met for bedtime dose of rapid acting insulin. Plan for repeat echo today. Will continue to monitor.

## 2017-08-28 ENCOUNTER — APPOINTMENT (OUTPATIENT)
Dept: CARDIOLOGY | Facility: CLINIC | Age: 62
DRG: 316 | End: 2017-08-28
Attending: INTERNAL MEDICINE
Payer: COMMERCIAL

## 2017-08-28 VITALS
BODY MASS INDEX: 33.04 KG/M2 | DIASTOLIC BLOOD PRESSURE: 52 MMHG | SYSTOLIC BLOOD PRESSURE: 105 MMHG | WEIGHT: 210.54 LBS | RESPIRATION RATE: 18 BRPM | HEART RATE: 72 BPM | OXYGEN SATURATION: 99 % | TEMPERATURE: 98.6 F | HEIGHT: 67 IN

## 2017-08-28 LAB
ANA PAT SER IF-IMP: ABNORMAL
ANA SER QL IF: POSITIVE
ANA TITR SER IF: ABNORMAL {TITER}
GLUCOSE BLDC GLUCOMTR-MCNC: 128 MG/DL (ref 70–99)
GLUCOSE BLDC GLUCOMTR-MCNC: 175 MG/DL (ref 70–99)
GLUCOSE BLDC GLUCOMTR-MCNC: 57 MG/DL (ref 70–99)
GLUCOSE BLDC GLUCOMTR-MCNC: 74 MG/DL (ref 70–99)

## 2017-08-28 PROCEDURE — 25800025 ZZH RX 258: Performed by: INTERNAL MEDICINE

## 2017-08-28 PROCEDURE — 99231 SBSQ HOSP IP/OBS SF/LOW 25: CPT | Performed by: INTERNAL MEDICINE

## 2017-08-28 PROCEDURE — 93308 TTE F-UP OR LMTD: CPT

## 2017-08-28 PROCEDURE — 25000132 ZZH RX MED GY IP 250 OP 250 PS 637: Performed by: INTERNAL MEDICINE

## 2017-08-28 PROCEDURE — 00000146 ZZHCL STATISTIC GLUCOSE BY METER IP

## 2017-08-28 PROCEDURE — 93325 DOPPLER ECHO COLOR FLOW MAPG: CPT | Mod: 26 | Performed by: INTERNAL MEDICINE

## 2017-08-28 PROCEDURE — 93308 TTE F-UP OR LMTD: CPT | Mod: 26 | Performed by: INTERNAL MEDICINE

## 2017-08-28 PROCEDURE — 93321 DOPPLER ECHO F-UP/LMTD STD: CPT | Mod: 26 | Performed by: INTERNAL MEDICINE

## 2017-08-28 PROCEDURE — 99238 HOSP IP/OBS DSCHRG MGMT 30/<: CPT | Performed by: INTERNAL MEDICINE

## 2017-08-28 RX ORDER — COLCHICINE 0.6 MG/1
0.6 CAPSULE ORAL 2 TIMES DAILY
Qty: 60 CAPSULE | Refills: 0 | Status: SHIPPED | OUTPATIENT
Start: 2017-08-28 | End: 2017-11-13

## 2017-08-28 RX ORDER — IBUPROFEN 800 MG/1
800 TABLET, FILM COATED ORAL EVERY 8 HOURS
Qty: 60 TABLET | Refills: 0 | Status: SHIPPED | OUTPATIENT
Start: 2017-08-28 | End: 2017-11-13

## 2017-08-28 RX ORDER — HYDROCODONE BITARTRATE AND ACETAMINOPHEN 5; 325 MG/1; MG/1
1 TABLET ORAL EVERY 6 HOURS PRN
Qty: 25 TABLET | Refills: 0 | Status: SHIPPED | OUTPATIENT
Start: 2017-08-28 | End: 2017-09-19

## 2017-08-28 RX ADMIN — OMEPRAZOLE 20 MG: 20 CAPSULE, DELAYED RELEASE ORAL at 08:14

## 2017-08-28 RX ADMIN — ATORVASTATIN CALCIUM 20 MG: 20 TABLET, FILM COATED ORAL at 08:14

## 2017-08-28 RX ADMIN — Medication 1 CAPSULE: at 08:14

## 2017-08-28 RX ADMIN — HYDROCODONE BITARTRATE AND ACETAMINOPHEN 1 TABLET: 5; 325 TABLET ORAL at 17:42

## 2017-08-28 RX ADMIN — IBUPROFEN 800 MG: 600 TABLET ORAL at 06:46

## 2017-08-28 RX ADMIN — LEVOTHYROXINE SODIUM 175 MCG: 125 TABLET ORAL at 08:17

## 2017-08-28 RX ADMIN — IBUPROFEN 800 MG: 600 TABLET ORAL at 14:25

## 2017-08-28 RX ADMIN — DEXTROSE MONOHYDRATE 50 ML: 500 INJECTION PARENTERAL at 11:22

## 2017-08-28 RX ADMIN — HYDROCODONE BITARTRATE AND ACETAMINOPHEN 1 TABLET: 5; 325 TABLET ORAL at 03:03

## 2017-08-28 RX ADMIN — COLCHICINE 0.6 MG: 0.6 TABLET, FILM COATED ORAL at 08:14

## 2017-08-28 RX ADMIN — HYDROCODONE BITARTRATE AND ACETAMINOPHEN 1 TABLET: 5; 325 TABLET ORAL at 08:14

## 2017-08-28 RX ADMIN — Medication 1 TABLET: at 08:14

## 2017-08-28 RX ADMIN — HYDROCHLOROTHIAZIDE 25 MG: 25 TABLET ORAL at 08:14

## 2017-08-28 RX ADMIN — Medication 1000 MCG: at 08:14

## 2017-08-28 ASSESSMENT — PAIN DESCRIPTION - DESCRIPTORS
DESCRIPTORS: ACHING
DESCRIPTORS: RADIATING

## 2017-08-28 NOTE — PROGRESS NOTES
[facility]    Hospitalist Progress Note    Date of Service (when I saw the patient): 08/28/2017    Assessment & Plan   Meera Mcgarry is a 62 year old female who was admitted on 8/25/2017 with pericarditis.    Pericarditis   Ms. Mcgarry presented on 8/25 with severe chest pain (9.5/10) radiating to her neck. She was found to have pericarditis on echocardiogram. Has h/o pericarditis many years ago, no intervention done at the time.   - started on scheduled ibuprofen and colchicine with symptomatic relief.    - continues on prn norco as well for pain.    - Echo 8/26 demonstrateed small (<1 cm) circumferential pericardial effusion with layer of echo dense material (0.6 cm), possibly fibrin layering or adipose tissue.  Possible effusive constrictive hemodynamics with respiration.  Repeat echo 8/27 with pericardial effusion slightly enlarged but no tamponade physiology.   - cards following, greatly appreciate their assistance  - s/p repeat echo this am 8/28  - with elevated ESR/ CRP 59/ 53.5 as well as leukocytosis (last checked on 8/25 at 17.5)  - await cardiology opinion re: diagnostic (?)/ therapeutic tap    DM2   Outpatient on glimepiride, lantus 25 at HS and victoza  - hold victoza, glimepiride  - decreased lantus to 15 at HS given hypoglycemia  - SSI available  - prn glucose for hypoglycemia (until eating)    HTN   Continue lisinopril, HCTZ with hold parameters    Hyperlipidemia  Continue atorvastatin.    Hypothyroidism   Continue levothyroxine.    GERD   Continue omeprazole.    DVT Prophylaxis: Pneumatic Compression Devices  Code Status: Full Code    Disposition: Expected discharge pending cardiology input    Domingo Morgan M.D.  Hospitalist  Pager [phone]  Text Page until 6 pm (after call answering service)      Interval History   The patient is resting in bed.  Doing well. States pain largely controlled, MUCH better than admission    -Data reviewed today: I reviewed all new labs and  imaging results over the last 24 hours. I personally reviewed no images or EKG's today.    Physical Exam   Temp: 98  F (36.7  C) Temp src: Oral BP: 97/46   Heart Rate: 67 Resp: 18 SpO2: 100 % O2 Device: None (Room air)    Vitals:    08/25/17 1634 08/27/17 0500 08/28/17 0232   Weight: 88.5 kg (195 lb) 95 kg (209 lb 7 oz) 95.5 kg (210 lb 8.6 oz)     Vital Signs with Ranges  Temp:  [97.9  F (36.6  C)-98.3  F (36.8  C)] 98  F (36.7  C)  Heart Rate:  [67-74] 67  Resp:  [16-18] 18  BP: ()/(46-66) 97/46  SpO2:  [95 %-100 %] 100 %  I/O last 3 completed shifts:  In: 740 [P.O.:740]  Out: -     Gen: Well nourished, well developed, alert and oriented x 3, no acute distressed  HEENT: Atraumatic, normocephalic  Lungs: Clear to ausculation without wheezes, rhonchi, or rales  Heart: Regular rate and rhythm, no murmurs. Unable to appreciate any rubs.   GI: Bowel sound normal, no hepatosplenomegaly or masses  Lymph: No lymphadenopathy or edema  Skin: No rashes     Medications        insulin glargine  15 Units Subcutaneous At Bedtime     multivitamin  with lutein  1 capsule Oral Daily     insulin aspart  1-10 Units Subcutaneous TID AC     insulin aspart  1-7 Units Subcutaneous At Bedtime     sodium chloride (PF)  3 mL Intracatheter Q8H     atorvastatin  20 mg Oral Daily     calcium-vitamin D  1 tablet Oral BID     cyanocobalamin  1,000 mcg Sublingual Daily     colchicine  0.6 mg Oral BID     hydrochlorothiazide  25 mg Oral Daily     ibuprofen  800 mg Oral Q8H     levothyroxine (SYNTHROID/LEVOTHROID) tablet 175 mcg  175 mcg Oral Once per day on Mon Tue Wed Thu Fri Sat     levothyroxine (SYNTHROID/LEVOTHROID) half-tab 87.5 mcg  87.5 mcg Oral Weekly     lisinopril  10 mg Oral Daily     omeprazole  20 mg Oral Daily     insulin aspart   Subcutaneous TID w/meals       Data     Recent Labs  Lab 08/25/17  1730 08/23/17  0048 08/22/17  2242   WBC 17.5*  --  12.8*   HGB 11.8  --  11.8   MCV 89  --  88     --  252     --  134    POTASSIUM 3.8  --  3.8   CHLORIDE 100  --  100   CO2 24  --  22   BUN 30  --  23   CR 1.05*  --  0.92   ANIONGAP 11  --  12   TRAE 8.7  --  9.9   *  --  295*   ALBUMIN 3.0*  --  3.0*   PROTTOTAL 7.3  --  7.2   BILITOTAL 0.3  --  0.4   ALKPHOS 74  --  75   ALT 18  --  19   AST 7  --  10   LIPASE  --   --  105   TROPI <0.015 <0.015 <0.015       No results found for this or any previous visit (from the past 24 hour(s)).

## 2017-08-28 NOTE — PROGRESS NOTES
"[facility]    Cardiology Progress Note    Date of Service (when I saw the patient): 08/28/2017     Assessment & Plan   Meera Mcgarry is a 62 year old female who was admitted on 8/25/2017 with pericardial effusion. Pt with history of  pericarditis in 1989 which was idiopathic in nature.  She does not remember with what she was treated but this resolved spontaneously.   On 8/22/17 she had similar symptoms and presented to the emergency department.  Reportedly a bedside echocardiogram revealed trace effusion but no other abnormalities.  The patient was dismissed on colchicine and ibuprofen.     On 8/2527 the patient had an acute episode with fevers, chills, chest/neck/jaw pain, and a general feeling of unpleasantness.  She reports in it in the emergency department where ultrasound revealed a slightly larger pericardial effusion however no hemodynamic instability.  She was again initiated on colchicine and ibuprofen along with opioids for pain relief.  She has not had any further exacerbations over additional symptoms. She is presently afebrile. Her exam shows no evidence of tamponade.       Assessment : \"idiopathic\" pericarditis. The risk of invasive testing to determine \"etiology\" exceeds benefit at this time and I would advise empiric rx with colchicine and NSAIDs as an initial step. I would avoid more aggressive testing or intervention unless refractory symptoms or tamponade.      Plan:   1. Follow up visit with NP with TTE in 2 to 3 weeks. Continue colchicine and ibuprofen at current doses until then, at the time of this visit would discontinue NSAID.   2. No indication for pericardiocentesis or biopsy at this time.   3. Discharge today ok with us      GRAHAM Paz, CNP    I have personally reviewed all of her TTES ( 4 studies , one in 2015 and 3 this admission) and discussed the findings with . The effusion remains small and no evidence of tamponade. She has a prominent pericardial " fat pad. Her lungs are clear. I can detect no friction rub. No pulsus paradoxus. 100/70 no change with respiration.     Interval History   Some pain with deep breathing (across upper back) no chills or fever. No SOB    Physical Exam   Temp: 98  F (36.7  C) Temp src: Oral BP: 97/66   Heart Rate: 67 Resp: 18 SpO2: 100 % O2 Device: None (Room air)    Vitals:    08/25/17 1634 08/27/17 0500 08/28/17 0232   Weight: 88.5 kg (195 lb) 95 kg (209 lb 7 oz) 95.5 kg (210 lb 8.6 oz)     Vital Signs with Ranges  Temp:  [97.9  F (36.6  C)-98.3  F (36.8  C)] 98  F (36.7  C)  Heart Rate:  [67-74] 67  Resp:  [16-18] 18  BP: ()/(50-66) 97/66  SpO2:  [95 %-100 %] 100 %  I/O last 3 completed shifts:  In: 740 [P.O.:740]  Out: -     Constitutional:   Alert and oriented, well developed, in no acute distress.   Skin:   Warm and dry to touch; no apparent skin lesions.     Lungs:   No increased work of breathing, good air exchange, fine crackles in the bases bilaterally.   Cardiovascular:   Regular rhythm, S1 normal, S2 normal, No S3 or S4, no murmurs or rubs detected.   Abdomen:   Abdomen soft, bowel sounds normoactive, no hepatosplenomegaly, non-tender.   Peripheral Pulses:   Pulses full and equal in all extremities, no bruits auscultated.   Extremities and Back:   No clubbing, cyanosis.  No edema observed.   Neurological:   No gross motor deficits noted, affect appropriate, oriented to time, person and place.         Medications        insulin glargine  22 Units Subcutaneous At Bedtime     multivitamin  with lutein  1 capsule Oral Daily     insulin aspart  1-10 Units Subcutaneous TID AC     insulin aspart  1-7 Units Subcutaneous At Bedtime     sodium chloride (PF)  3 mL Intracatheter Q8H     atorvastatin  20 mg Oral Daily     calcium-vitamin D  1 tablet Oral BID     cyanocobalamin  1,000 mcg Sublingual Daily     colchicine  0.6 mg Oral BID     hydrochlorothiazide  25 mg Oral Daily     ibuprofen  800 mg Oral Q8H     levothyroxine  (SYNTHROID/LEVOTHROID) tablet 175 mcg  175 mcg Oral Once per day on Mon Tue Wed Thu Fri Sat     levothyroxine (SYNTHROID/LEVOTHROID) half-tab 87.5 mcg  87.5 mcg Oral Weekly     lisinopril  10 mg Oral Daily     omeprazole  20 mg Oral Daily     insulin aspart   Subcutaneous TID w/meals       Data   I personally reviewed SR  Results for orders placed or performed during the hospital encounter of 17 (from the past 24 hour(s))   Echocardiogram Limited    Narrative    [id number]  [facility]  [id number]  [id number]^HERNANDO^JIGNA^JOSE MARTIN           [facility]  Echocardiography Laboratory  [address] [place] [address]        Name: GURPREET DUVAL  MRN: [id number]  : 1955  Study Date: 2017 10:01 AM  Age: 62 yrs  Gender: Female  Patient Location: [facility]  Reason For Study: Pericarditis  Ordering Physician: JIGNA VILLAGOMEZ  Referring Physician: JIGNA VILLAGOMEZ  Performed By: GIAN Dyer     BSA: 2.1 m2  Height: 67 in  Weight: 209 lb  HR: 75  BP: 92/58 mmHg  _____________________________________________________________________________  __        Procedure  Limited Portable Echo Adult.  _____________________________________________________________________________  __        Interpretation Summary     Small pericardial effusion  A circumferential pericardial effusion is noted.  There are no echocardiographic indications of cardiac tamponade.  Compared to prior study, there is no significant change.  _____________________________________________________________________________  __        Left Ventricle  The left ventricle is normal in size. There is normal left ventricular wall  thickness. Normal left ventricular diastolic function. Left ventricular  systolic function is normal. The visual ejection fraction is estimated at 55-  60%. Normal left ventricular wall motion.     Right Ventricle  The right ventricle is normal in structure, function and size.     Atria  Normal left atrial size. Right  atrial size is normal. There is no atrial shunt  seen.     Mitral Valve  The mitral valve is normal in structure and function. There is trace mitral  regurgitation.        Tricuspid Valve  The tricuspid valve is normal in structure and function. Right ventricle  systolic pressure estimate normal. There is trace tricuspid regurgitation.     Aortic Valve  The aortic valve is trileaflet with aortic valve sclerosis. No aortic  regurgitation is present. No aortic stenosis is present.     Pulmonic Valve  The pulmonic valve is not well seen, but is grossly normal. There is trace  pulmonic valvular regurgitation.     Vessels  Normal size aorta. The IVC is normal in size and reactivity with respiration,  suggesting normal central venous pressure.     Pericardium  Small pericardial effusion. A circumferential pericardial effusion is noted.  There are no echocardiographic indications of cardiac tamponade.     _____________________________________________________________________________  __  MMode/2D Measurements & Calculations  IVSd: 1.2 cm  LVIDd: 5.0 cm  LVPWd: 0.93 cm  EDV(Teich): 115.6 ml  LV mass(C)d: 192.0 grams  LV mass(C)dI: 93.2 grams/m2           Doppler Measurements & Calculations  MV E max harlan: 62.0 cm/sec  Pulm Sys Harlan: 59.5 cm/sec  Pulm Schmitt Harlan: 34.9 cm/sec  Pulm A Revs Harlan: 39.1 cm/sec  Pulm S/D: 1.7           _____________________________________________________________________________  __           Report approved by: Heather Lizama 08/27/2017 04:01 PM      Glucose by meter   Result Value Ref Range    Glucose 143 (H) 70 - 99 mg/dL   Glucose by meter   Result Value Ref Range    Glucose 67 (L) 70 - 99 mg/dL   Glucose by meter   Result Value Ref Range    Glucose 65 (L) 70 - 99 mg/dL   Glucose by meter   Result Value Ref Range    Glucose 79 70 - 99 mg/dL   Glucose by meter   Result Value Ref Range    Glucose 113 (H) 70 - 99 mg/dL   Glucose by meter   Result Value Ref Range    Glucose 201 (H) 70 - 99 mg/dL    Glucose by meter   Result Value Ref Range    Glucose 175 (H) 70 - 99 mg/dL   Glucose by meter   Result Value Ref Range    Glucose 74 70 - 99 mg/dL

## 2017-08-28 NOTE — PROVIDER NOTIFICATION
MD Notification    Notified Person:  MD    Notified Persons Name: Dr. Morgan    Notification Date/Time: 8/28/17 at 12:20pm    Notification Interaction:  Text paged    Purpose of Notification: low blood glucose    Orders Received:     Comments: 50% PRN dextrose given for low blood sugar 57. Checked blood glucose in 15 mins. It was 128.

## 2017-08-28 NOTE — PLAN OF CARE
Problem: Goal Outcome Summary  Goal: Goal Outcome Summary  Outcome: No Change  A/O, VSS, last /66. O2sats 95% on RA. Tele SR 70s. Given norco and schedule ibuprofen for 5/10 chest pain with some relief. SBA. Heart sounds audible. BG 65 at dinner, asymptomatic; 113 after food.  at bedtime, orders received from on-call hospitalist to adjust antidiabetic meds in anticipation of NPO status, see progress note. Plan for TTE in AM, then possible pericardiocentesis to collect sample. NPO at midnight.

## 2017-08-28 NOTE — PROGRESS NOTES
Pt. Admitted with chest pain , SOB and fever.   Discharge Dx. - Pericarditis   Home on cholchicine and Norco.   VSS.  Does still have chest pain and some GREEN.   [place] Rx with patient at time of discharge.   Discharge to home with friend driving.

## 2017-08-28 NOTE — PROVIDER NOTIFICATION
Notified Person: Dr. Johnson, on-call     Notification Date/Time: 10:09 PM 08/27/17     Notification Interaction: page     Purpose of Notification: pt NPO after midnight,  with episode of hypoglycemia this evening, any adjustments for antidiabetic meds tonight?     Orders Received: Give 22 units lantus, give SSI; hold victoza, hold glimepiride in AM

## 2017-08-28 NOTE — PLAN OF CARE
Problem: Goal Outcome Summary  Goal: Goal Outcome Summary  Outcome: No Change  A&Ox4. Soft BP and all other VSS on RA. Tele: NSR, HR 60-70's. C/o of neck pain - 1 tab of norco given w/ decrease in pain. Pt denies SOB. Elevated 2 am blood sugar check of 175 mg/dL. Up independently, pt education done on remaining in bed and calling if dizziness occurs. No c/o dizziness overnight. Plan for repeat echo today. Pending echo results pt will have pericardialcentesis today to obtain fluid sample to test. Pt has been NPO from midnight on. Will continue to monitor.

## 2017-08-28 NOTE — DISCHARGE SUMMARY
[facility]    Discharge Summary  Hospitalist    Date of Admission:  8/25/2017  Date of Discharge:  8/28/2017  Discharging Provider: Domingo Morgan MD  Date of Service (when I saw the patient): 08/28/17    Discharge Diagnoses   Pericarditis, idiopathic  Diabetes Mellitus, type 2  Hypertension  Hyperlipidemia  Hypothyroidism  GERD    History of Present Illness   Ms. Nur is a delightfully pleasant 61 y/o female admitted with chest pain. She was ultimately found to have a pericardial effusion and was diagnosed with pericarditis.     Hospital Course   Meera Mcgarry was admitted on 8/25/2017.  The following problems were addressed during her hospitalization:    Pericarditis, idiopathic  Ms. Nur presented to the hospital with complaints of chest and neck pain, quite severe. Initial presentation showed negative troponins and d dimer. She was found on echochardiogram to have a pericardial effusion, described as being small. She had repeat echocardiogram daily the following two days and the effusion was unchanged in size. Her inflammatory markers were elevated, with a CRP and ESR of 53.5 and 59, respectively. She also had an elevated WBC count noted during her admission at 17.5. Blood cultures were negative. Cardiology followed along, and planned to tap the effusion if it were to grow in size. However given it's size and stability, it was elected to manage conservatively with colchicine and ibuprofen, which provided substantial relief of the pain. Cardiology cleared her for discharge, and she will follow up with them in 2-3 weeks with a repeat echocardiogram as well as cardiology follow up. She will be advised to return to clinic if her pain worsens, she develops fevers or other concerning symptoms.     Diabetes Mellitus, type 2  At the time of discharge she should restart her prior to admission medications.     Hypertension  No changes were made to her antihypertensive regimen during her stay  and she should resume her prior to admission medications.     Hyperlipidemia  Continue on prior to admission statin.    Hypothyroidism  Continue on prior to admission levothyroxine.     GERD  Resume prior to admission medication management.    Domingo Morgan M.D.  Hospitalist  Pager [phone]    Significant Results and Procedures   Echocardiogram x 3  CXR  CT pulmonary angiogram    Pending Results   Unresulted Labs Ordered in the Past 30 Days of this Admission     Date and Time Order Name Status Description    8/25/2017 1800 Blood culture Preliminary     8/25/2017 1702 Blood culture Preliminary           Code Status   Full Code       Primary Care Physician   CHANTE RUCKER    Physical Exam   Temp: 98.6  F (37  C) Temp src: Oral BP: 105/52 Pulse: 72 Heart Rate: 74 Resp: 18 SpO2: 99 % O2 Device: None (Room air)    Vitals:    08/25/17 1634 08/27/17 0500 08/28/17 0232   Weight: 88.5 kg (195 lb) 95 kg (209 lb 7 oz) 95.5 kg (210 lb 8.6 oz)     Vital Signs with Ranges  Temp:  [97.9  F (36.6  C)-98.6  F (37  C)] 98.6  F (37  C)  Pulse:  [72] 72  Heart Rate:  [67-74] 74  Resp:  [18] 18  BP: ()/(46-66) 105/52  SpO2:  [95 %-100 %] 99 %  I/O last 3 completed shifts:  In: 540 [P.O.:540]  Out: -     Constitutional: Alert, oriented, no acute distress  Respiratory: Lungs clear to auscultation bilaterally, no wheezes, no crackles  Cardiovascular: Regular rate and rhythm, no murmurs. Unable to appreciate a friction rub  GI: Soft, non-tender, non-disteneded, good bowel sounds  Skin/Integumen: No erythema, cyanosis or edema  Other:      Discharge Disposition   Discharged to home  Condition at discharge: Stable    Consultations This Hospital Stay   CARDIOLOGY IP CONSULT    Time Spent on this Encounter   IDomingo, personally saw the patient today and spent less than or equal to 30 minutes discharging this patient.    Discharge Orders     Follow-Up with Cardiologist     Reason for your hospital stay   You were  hospitalized secondary to pericarditis (irritiation/ inflammation of the sac lining your heart)     Follow-up and recommended labs and tests    Follow up with primary care provider, CHANTE RUCKER, within 7 days for hospital follow- up.  The following labs/tests are recommended: CBC, BMP.     Activity   Your activity upon discharge: activity as tolerated     When to contact your care team   Call your primary doctor if you have any of the following: temperature greater than 100.4,  increased shortness of breath or increased pain.     Follow-up and recommended labs and tests    A follow-up appointment has been made for you with  Dr. Chante Jones on Thursday, September 7th at 11AM at [facility] is temporarily closed. Please call the clinic at [phone] should you need to change or cancel your appointment.     Full Code     Echocardiogram   Administration of IV contrast will be tailored to this examination per the appropriate written protocol listed in the Echocardiography department Protocol Book, or by the supervising Cardiologist. This may result in an order change.    Use of contrast is at the discretion of the supervising Cardiologist.     Diet   Follow this diet upon discharge: Orders Placed This Encounter     Combination Diet Regular Diet Adult; 5259-7086 Calories: Low Consistent CHO (3-5 CHO units/meal)       Discharge Medications   Current Discharge Medication List      START taking these medications    Details   HYDROcodone-acetaminophen (NORCO) 5-325 MG per tablet Take 1 tablet by mouth every 6 hours as needed for moderate to severe pain  Qty: 25 tablet, Refills: 0    Associated Diagnoses: Acute idiopathic pericarditis         CONTINUE these medications which have CHANGED    Details   ibuprofen (ADVIL/MOTRIN) 800 MG tablet Take 1 tablet (800 mg) by mouth every 8 hours  Qty: 60 tablet, Refills: 0    Associated Diagnoses: Acute idiopathic pericarditis      Colchicine 0.6 MG  CAPS Take 0.6 mg by mouth 2 times daily  Qty: 60 capsule, Refills: 0    Associated Diagnoses: Acute idiopathic pericarditis         CONTINUE these medications which have NOT CHANGED    Details   GLIMEPIRIDE PO Take 4 mg by mouth daily (with breakfast)      insulin glargine (LANTUS) 100 UNIT/ML injection Inject 25 Units Subcutaneous At Bedtime      !! LEVOTHYROXINE SODIUM PO Take 175 mcg by mouth daily 1 tablet daily x 6 days weekly, then 1/2 tablet (87.5 mcg) on Sundays      !! LEVOTHYROXINE SODIUM PO Take 87.5 mcg by mouth once a week Takes 1/2 of 175mcg tablet once weekly on Sundays & 1 tablet 6 days weekly.      omeprazole (PRILOSEC) 20 MG CR capsule Take 1 capsule (20 mg) by mouth daily  Qty: 30 capsule, Refills: 0      VICTOZA PEN 18 MG/3ML soln INJECT 1.8 MG UNDER THE SKIN DAILY  Qty: 27 mL, Refills: 0    Comments: Medication is being filled for one month only due to: Patient needs to be seen because it has been more than one year since last visit.  Associated Diagnoses: Uncomplicated type 2 diabetes mellitus (H)      hydrochlorothiazide (HYDRODIURIL) 25 MG tablet Take 1 tablet (25 mg) by mouth daily  Qty: 90 tablet, Refills: 0    Associated Diagnoses: Benign essential hypertension      atorvastatin (LIPITOR) 20 MG tablet Take 1 tablet (20 mg) by mouth daily  Qty: 90 tablet, Refills: 0    Comments: ++Medication is being filled for 1 time refill only due to:  LAB WORK NEEDED BEFORE FURTHER REFILLS++  Associated Diagnoses: Hyperlipidemia with target LDL less than 100      lisinopril (PRINIVIL,ZESTRIL) 10 MG tablet Take 1 tablet (10 mg) by mouth daily  Qty: 90 tablet, Refills: 1    Associated Diagnoses: Hypertension goal BP (blood pressure) < 140/90      Multiple Vitamins-Minerals (ICAPS AREDS FORMULA PO) Take 1 tablet by mouth daily       calcium-vitamin D (CALTRATE) 600-400 MG-UNIT per tablet Take 1 tablet by mouth 2 times daily      aspirin 81 MG tablet Take 81 mg by mouth daily       Cyanocobalamin (B-12)  1000 MCG TBCR Take 1,000 mcg by mouth daily  Qty: 100 tablet, Refills: 1      LORAZEPAM PO Take 0.5 mg by mouth nightly as needed for anxiety or other (travel insomnia)      B-D U/F 31G X 8 MM insulin pen needle USE 2 TIMES DAILY OR AS DIRECTED  Qty: 100 each, Refills: 0    Comments: Medication is being filled for one month only due to: Patient needs to be seen because it has been more than one year since last visit.  Associated Diagnoses: Uncomplicated type 2 diabetes mellitus (H)      blood glucose monitoring (ACCU-CHEK SMARTVIEW) test strip To check glucose four times per day  Qty: 100 each, Refills: 3    Associated Diagnoses: Type 2 diabetes mellitus without complication, without long-term current use of insulin (H)      blood glucose monitoring (ACCU-CHEK FASTCLIX) lancets Up to four lancets per day or as directed.  Qty: 4 Box, Refills: 0    Associated Diagnoses: Type 2 diabetes mellitus without complication, without long-term current use of insulin (H)       !! - Potential duplicate medications found. Please discuss with provider.        Allergies   Allergies   Allergen Reactions     Amoxicillin      Data   Most Recent 3 CBC's:  Recent Labs   Lab Test  08/25/17   1730  08/22/17   2242   WBC  17.5*  12.8*   HGB  11.8  11.8   MCV  89  88   PLT  311  252      Most Recent 3 BMP's:  Recent Labs   Lab Test  08/25/17   1730  08/22/17   2242  05/16/16   0913   NA  135  134  138   POTASSIUM  3.8  3.8  4.3   CHLORIDE  100  100  101   CO2  24  22  29   BUN  30  23  21   CR  1.05*  0.92  0.82   ANIONGAP  11  12  8   TRAE  8.7  9.9  9.1   GLC  420*  295*  300*     Most Recent 2 LFT's:  Recent Labs   Lab Test  08/25/17   1730  08/22/17   2242   AST  7  10   ALT  18  19   ALKPHOS  74  75   BILITOTAL  0.3  0.4     Most Recent INR's and Anticoagulation Dosing History:  Anticoagulation Dose History     There is no flowsheet data to display.        Most Recent 3 Troponin's:  Recent Labs   Lab Test  08/25/17   1730  08/23/17    0048  08/22/17   2242   TROPI  <0.015  <0.015  <0.015     Most Recent Cholesterol Panel:  Recent Labs   Lab Test  11/03/15   0906   CHOL  161   LDL  61   HDL  40*   TRIG  301*     Most Recent 6 Bacteria Isolates From Any Culture (See EPIC Reports for Culture Details):  Recent Labs   Lab Test  08/25/17   1830  08/25/17   1730   CULT  No growth after 3 days  No growth after 3 days     Most Recent TSH, T4 and A1c Labs:  Recent Labs   Lab Test  05/16/16   0907   02/24/15   0754   TSH  0.87   < >  0.36*   T4   --    --   1.65*   A1C  8.7*   < >  8.4*    < > = values in this interval not displayed.     Results for orders placed or performed during the hospital encounter of 08/25/17   XR Chest 2 Views    Narrative    CHEST TWO VIEWS  8/25/2017 5:44 PM     COMPARISON: Two view chest x-ray 8/22/2017    HISTORY: Shortness of breath    FINDINGS: The cardiac silhouette, pulmonary vasculature, lungs and  pleural spaces are within normal limits.      Impression    IMPRESSION: Clear lungs.    ALDEN GARCIA MD   POC US ECHO LIMITED    Impression    PROCEDURE: Point of care bedside Cardiac US  PERFORMED BY: Dr. Dee Romero  INDICATIONS/SYMPTOM: Chest pain.  PROBE: Phased array cardiac   BODY LOCATION: The US was performed in the parasternal long  FINDINGS: Mild pericardial effusion seen  IMAGE DOCUMENTATION: Images were archived to the US hard drive, and archived to hospital IT systems.

## 2017-08-28 NOTE — PLAN OF CARE
Problem: Goal Outcome Summary  Goal: Goal Outcome Summary  Outcome: No Change  A&OX4. PRN norco given once and scheduled ibuprofen given for chest pain. TELE NSR. Up with SBA. Vitals stable. Pt was NPO this morning. Blood glucose dropped to 57 ml/hr along with dizziness. 50% Dextrose 25 ml given. Blood glucose increased to 128 ml/hr. Insuline lantus decreased to 15 units from 22 units. Cancelled pericardocenthesis.Plan to d/c home this afternoon.

## 2017-08-28 NOTE — PROGRESS NOTES
PT NPO after midnight. Low blood sugars earlier today. Will decrease lantus to 22units at bedtime. D/c victoza and amaryl for now. Use NYASIA Johnson MD

## 2017-08-28 NOTE — DISCHARGE INSTRUCTIONS
A follow-up appointment has been made for you with  Dr. Angella Jones on Thursday, September 7th at 11AM at [facility] is temporarily closed. Please call the clinic at [phone] should you need to change or cancel your appointment. Please bring a photo ID, insurance card, and copay.       A follow-up appointment was scheduled for you [see above].  It is very important to go to it--bring these papers and your insurance card with you.  At the visit, talk about your hospital stay.  Tell your doctor how you feel.  Show your new list of medications.  Your doctor will update records, make sure you are still doing OK, and decide if any tests or medication changes are needed.

## 2017-08-29 ENCOUNTER — CARE COORDINATION (OUTPATIENT)
Dept: CARDIOLOGY | Facility: CLINIC | Age: 62
End: 2017-08-29

## 2017-08-29 ENCOUNTER — TELEPHONE (OUTPATIENT)
Dept: FAMILY MEDICINE | Facility: CLINIC | Age: 62
End: 2017-08-29

## 2017-08-29 NOTE — PROGRESS NOTES
Called patient to discuss any post hospital d/c questions she may have, review medication changes, and confirm f/u appts. Patient denied any questions regarding new medications or changes to some of her current medications that she was taking prior to admission. Patient denied any SOB, or increased chest pain, or light headedness. RN confirmed with patient that she has an apt scheduled on 9/15/17 for ECHO and on 9/18/17 with LAUREN Bessie Metcalf. Patient advised to call clinic with any cardiac related questions or concerns prior to her apt on 9/18/17. Patient verbalized understanding and agreed with plan.

## 2017-08-31 LAB
BACTERIA SPEC CULT: NO GROWTH
BACTERIA SPEC CULT: NO GROWTH
Lab: NORMAL
Lab: NORMAL
SPECIMEN SOURCE: NORMAL
SPECIMEN SOURCE: NORMAL

## 2017-08-31 NOTE — TELEPHONE ENCOUNTER
I did reach Meera and she says she is fine, sleeping a lot.  She says she knows what her f/u plan is and has scheduled with her primary.  She says she does not need to review her meds.    She also says she has been trying to tell [facility] for over one year that she is not a [place] patient anymore.  She sees Angella Jones in the [facility] system.    I told her I would try to see if we can do something so that we stop calling her.  She says she is called quite a lot from [facility] reminding her she is due for things.      Lynn Deluna RN

## 2017-09-15 ENCOUNTER — HOSPITAL ENCOUNTER (OUTPATIENT)
Dept: CARDIOLOGY | Facility: CLINIC | Age: 62
Discharge: HOME | End: 2017-09-15
Attending: NURSE PRACTITIONER | Admitting: NURSE PRACTITIONER
Payer: COMMERCIAL

## 2017-09-15 DIAGNOSIS — I30.1 ACUTE INFECTIVE PERICARDITIS, UNSPECIFIED INFECTIOUS ETIOLOGY: ICD-10-CM

## 2017-09-15 PROCEDURE — 93308 TTE F-UP OR LMTD: CPT | Mod: 26 | Performed by: INTERNAL MEDICINE

## 2017-09-15 PROCEDURE — 93325 DOPPLER ECHO COLOR FLOW MAPG: CPT | Mod: 26 | Performed by: INTERNAL MEDICINE

## 2017-09-15 PROCEDURE — 93308 TTE F-UP OR LMTD: CPT

## 2017-09-15 PROCEDURE — 93306 TTE W/DOPPLER COMPLETE: CPT

## 2017-09-15 PROCEDURE — 93321 DOPPLER ECHO F-UP/LMTD STD: CPT | Mod: 26 | Performed by: INTERNAL MEDICINE

## 2017-09-19 ENCOUNTER — OFFICE VISIT (OUTPATIENT)
Dept: CARDIOLOGY | Facility: CLINIC | Age: 62
End: 2017-09-19
Payer: COMMERCIAL

## 2017-09-19 VITALS
WEIGHT: 204.2 LBS | DIASTOLIC BLOOD PRESSURE: 78 MMHG | BODY MASS INDEX: 32.05 KG/M2 | HEIGHT: 67 IN | HEART RATE: 83 BPM | SYSTOLIC BLOOD PRESSURE: 132 MMHG

## 2017-09-19 DIAGNOSIS — I10 HYPERTENSION GOAL BP (BLOOD PRESSURE) < 140/90: ICD-10-CM

## 2017-09-19 DIAGNOSIS — I31.39 PERICARDIAL EFFUSION: ICD-10-CM

## 2017-09-19 DIAGNOSIS — E78.5 HYPERLIPIDEMIA WITH TARGET LDL LESS THAN 100: Primary | ICD-10-CM

## 2017-09-19 PROCEDURE — 99214 OFFICE O/P EST MOD 30 MIN: CPT | Performed by: NURSE PRACTITIONER

## 2017-09-19 RX ORDER — AMPICILLIN TRIHYDRATE 250 MG
1 CAPSULE ORAL DAILY
COMMUNITY

## 2017-09-19 RX ORDER — INSULIN GLARGINE 100 [IU]/ML
INJECTION, SOLUTION SUBCUTANEOUS
COMMUNITY

## 2017-09-19 NOTE — LETTER
9/19/2017    Angella Jones DO  [facility]  [address] [place] [address]    RE: Meera Colmenaresrogelio       Dear Colleague,    I had the pleasure of seeing Meera Mcgarry in the [facility].    Meera is a 62-year-old woman whom I am seeing in followup from a recent hospitalization where she was found to have a pericardial effusion and was diagnosed with pericarditis.      She presented to the hospital with complaints of chest pain, neck pain.  Initial presentation showed negative troponins and a D-Dimer cardiac echo was done which showed a small pericardial effusion.  Her inflammatory markers were elevated with a CRP of 53.5 and an ESR of 59.  She also had elevated WBC count of 17.5.  Blood cultures were negative.  Her effusions stabilized.  She did not require any pericardial tap.  She was treated with colchicine and ibuprofen.  Today, she is here in followup.  Yesterday she had a cardiac echo to evaluate her pericardial effusion.  The echo described her effusion now as trivial in size.  There were no echocardiographic indications of cardiac tamponade.  The effusion appeared to undergo fibrinous organization.  She had a normal heart function with EF of 60-65%, grade 1 early diastolic dysfunction.  There were no regional wall motion abnormalities.      PAST MEDICAL HISTORY:  Includes hypertension.  Her blood pressure was well managed here today.      She has a history of diabetes mellitus with a last hemoglobin back in May indicating an A1c of 8.7.  She is on statin therapy with a total cholesterol of 161, HDL 40, LDL 61, triglycerides elevated at 301.      Today, she complains of dryness in her mouth to the extent she has cracks in the inside corners of her mouth.  She complains of loose stools, although not in the diarrhea state.  She does have loose frequent stools.  Finally, she is wondering about the interaction between her atorvastatin and colchicine.       Today, she reports almost complete resolution of her chest pain.  She says she is back to 80% of her energy.  She still wants to take a nap a day, but since her admission she is pleased with her slow improvement of her energy level.  Please see review of systems and physical exam.      Outpatient Encounter Prescriptions as of 9/19/2017   Medication Sig Dispense Refill     insulin glargine (BASAGLAR KWIKPEN) 100 UNIT/ML injection Take as directed       cinnamon 500 MG CAPS Take 1 tablet by mouth daily       ibuprofen (ADVIL/MOTRIN) 800 MG tablet Take 1 tablet (800 mg) by mouth every 8 hours (Patient taking differently: Take 800 mg by mouth 2 times daily ) 60 tablet 0     Colchicine 0.6 MG CAPS Take 0.6 mg by mouth 2 times daily 60 capsule 0     LEVOTHYROXINE SODIUM PO Take 175 mcg by mouth daily 1 tablet daily x 6 days weekly, then 1/2 tablet (87.5 mcg) on Sundays       LEVOTHYROXINE SODIUM PO Take 87.5 mcg by mouth once a week Takes 1/2 of 175mcg tablet once weekly on Sundays & 1 tablet 6 days weekly.       VICTOZA PEN 18 MG/3ML soln INJECT 1.8 MG UNDER THE SKIN DAILY 27 mL 0     hydrochlorothiazide (HYDRODIURIL) 25 MG tablet Take 1 tablet (25 mg) by mouth daily 90 tablet 0     lisinopril (PRINIVIL,ZESTRIL) 10 MG tablet Take 1 tablet (10 mg) by mouth daily 90 tablet 1     Multiple Vitamins-Minerals (ICAPS AREDS FORMULA PO) Take 1 tablet by mouth daily        aspirin 81 MG tablet Take 81 mg by mouth daily        Cyanocobalamin (B-12) 1000 MCG TBCR Take 1,000 mcg by mouth daily 100 tablet 1     [DISCONTINUED] HYDROcodone-acetaminophen (NORCO) 5-325 MG per tablet Take 1 tablet by mouth every 6 hours as needed for moderate to severe pain 25 tablet 0     [DISCONTINUED] GLIMEPIRIDE PO Take 4 mg by mouth daily (with breakfast)       [DISCONTINUED] insulin glargine (LANTUS) 100 UNIT/ML injection Inject 25 Units Subcutaneous At Bedtime       [DISCONTINUED] LORAZEPAM PO Take 0.5 mg by mouth nightly as needed for anxiety or  other (travel insomnia)       [DISCONTINUED] omeprazole (PRILOSEC) 20 MG CR capsule Take 1 capsule (20 mg) by mouth daily 30 capsule 0     B-D U/F 31G X 8 MM insulin pen needle USE 2 TIMES DAILY OR AS DIRECTED 100 each 0     [DISCONTINUED] atorvastatin (LIPITOR) 20 MG tablet Take 1 tablet (20 mg) by mouth daily 90 tablet 0     blood glucose monitoring (ACCU-CHEK SMARTVIEW) test strip To check glucose four times per day 100 each 3     blood glucose monitoring (ACCU-CHEK FASTCLIX) lancets Up to four lancets per day or as directed. 4 Box 0     [DISCONTINUED] calcium-vitamin D (CALTRATE) 600-400 MG-UNIT per tablet Take 1 tablet by mouth 2 times daily       No facility-administered encounter medications on file as of 9/19/2017.      IMPRESSION AND PLAN:  The patient with a history of pericardial effusion with pericarditis.  Today, she presents with a post-hospital followup after almost being on colchicine for 1 month and ibuprofen 800 mg 3 times a day.  An echo that was done yesterday demonstrates that the size of her effusion now is now described as trivial.        Today I have made the following recommendations.     1.  Wean off her ibuprofen with complete resolution of stopping it in the next few days.   2.  Reduce her colchicine due to the side effects she reported to once a day.  She will continue with the once a day dosing for the next 2 months.     3.  Finally, I will have her follow up with Dr. Machuca in 2 months for a post-hospital followup.      It has been my pleasure to be involved in Meera's care.     Sincerely,    GRAHAM Paz [facility]

## 2017-09-19 NOTE — PROGRESS NOTES
HISTORY OF PRESENT ILLNESS:  Meera is a 62-year-old woman whom I am seeing in followup from a recent hospitalization where she was found to have a pericardial effusion and was diagnosed with pericarditis.      She presented to the hospital with complaints of chest pain, neck pain.  Initial presentation showed negative troponins and a D-Dimer cardiac echo was done which showed a small pericardial effusion.  Her inflammatory markers were elevated with a CRP of 53.5 and an ESR of 59.  She also had elevated WBC count of 17.5.  Blood cultures were negative.  Her effusions stabilized.  She did not require any pericardial tap.  She was treated with colchicine and ibuprofen.  Today, she is here in followup.  Yesterday she had a cardiac echo to evaluate her pericardial effusion.  The echo described her effusion now as trivial in size.  There were no echocardiographic indications of cardiac tamponade.  The effusion appeared to undergo fibrinous organization.  She had a normal heart function with EF of 60-65%, grade 1 early diastolic dysfunction.  There were no regional wall motion abnormalities.      PAST MEDICAL HISTORY:  Includes hypertension.  Her blood pressure was well managed here today.      She has a history of diabetes mellitus with a last hemoglobin back in May indicating an A1c of 8.7.  She is on statin therapy with a total cholesterol of 161, HDL 40, LDL 61, triglycerides elevated at 301.      Today, she complains of dryness in her mouth to the extent she has cracks in the inside corners of her mouth.  She complains of loose stools, although not in the diarrhea state.  She does have loose frequent stools.  Finally, she is wondering about the interaction between her atorvastatin and colchicine.      Today, she reports almost complete resolution of her chest pain.  She says she is back to 80% of her energy.  She still wants to take a nap a day, but since her admission she is pleased with her slow improvement of  her energy level.  Please see review of systems and physical exam.      IMPRESSION AND PLAN:  The patient with a history of pericardial effusion with pericarditis.  Today, she presents with a post-hospital followup after almost being on colchicine for 1 month and ibuprofen 800 mg 3 times a day.  An echo that was done yesterday demonstrates that the size of her effusion now is now described as trivial.        Today I have made the following recommendations.     1.  Wean off her ibuprofen with complete resolution of stopping it in the next few days.   2.  Reduce her colchicine due to the side effects she reported to once a day.  She will continue with the once a day dosing for the next 2 months.     3.  Finally, I will have her follow up with Dr. Machuca in 2 months for a post-hospital followup.      It has been my pleasure to be involved in Meera's care.         GRAHAM SINGH, CNP             D: 2017 13:27   T: 2017 15:29   MT: ANGELA      Name:     MEERA DUVAL   MRN:      [id number]        Account:      [id number]   :      1955           Service Date: 2017      Document: [id number]

## 2017-09-19 NOTE — PATIENT INSTRUCTIONS
Medication changes:  Reduce the ibuprofen to 2x per day for 3 days, then stop completely    Reduce the colchicine to 0.6 mg daily    With these changes, monitor for worsening symptoms and give us a call.    Call Natalie Yo about your concerns about your medical record. [phone]

## 2017-09-19 NOTE — PROGRESS NOTES
HPI and Plan:   See dictation    Orders Placed This Encounter   Procedures     Follow-Up with Cardiologist     Orders Placed This Encounter   Medications     insulin glargine (BASAGLAR KWIKPEN) 100 UNIT/ML injection     Sig: Take as directed     cinnamon 500 MG CAPS     Sig: Take 1 tablet by mouth daily     Medications Discontinued During This Encounter   Medication Reason     insulin glargine (LANTUS) 100 UNIT/ML injection Dose adjustment     atorvastatin (LIPITOR) 20 MG tablet Discontinued by another Health Care Provider     calcium-vitamin D (CALTRATE) 600-400 MG-UNIT per tablet Stopped by Patient     GLIMEPIRIDE PO Discontinued by another Health Care Provider     HYDROcodone-acetaminophen (NORCO) 5-325 MG per tablet Stopped by Patient     LORAZEPAM PO Stopped by Patient     omeprazole (PRILOSEC) 20 MG CR capsule Stopped by Patient         Encounter Diagnoses   Name Primary?     Hyperlipidemia with target LDL less than 100 Yes     Hypertension goal BP (blood pressure) < 140/90      Pericardial effusion        CURRENT MEDICATIONS:  Current Outpatient Prescriptions   Medication Sig Dispense Refill     insulin glargine (BASAGLAR KWIKPEN) 100 UNIT/ML injection Take as directed       cinnamon 500 MG CAPS Take 1 tablet by mouth daily       ibuprofen (ADVIL/MOTRIN) 800 MG tablet Take 1 tablet (800 mg) by mouth every 8 hours 60 tablet 0     Colchicine 0.6 MG CAPS Take 0.6 mg by mouth 2 times daily 60 capsule 0     LEVOTHYROXINE SODIUM PO Take 175 mcg by mouth daily 1 tablet daily x 6 days weekly, then 1/2 tablet (87.5 mcg) on Sundays       LEVOTHYROXINE SODIUM PO Take 87.5 mcg by mouth once a week Takes 1/2 of 175mcg tablet once weekly on Sundays & 1 tablet 6 days weekly.       VICTOZA PEN 18 MG/3ML soln INJECT 1.8 MG UNDER THE SKIN DAILY 27 mL 0     hydrochlorothiazide (HYDRODIURIL) 25 MG tablet Take 1 tablet (25 mg) by mouth daily 90 tablet 0     lisinopril (PRINIVIL,ZESTRIL) 10 MG tablet Take 1 tablet (10 mg) by mouth  daily 90 tablet 1     Multiple Vitamins-Minerals (ICAPS AREDS FORMULA PO) Take 1 tablet by mouth daily        aspirin 81 MG tablet Take 81 mg by mouth daily        Cyanocobalamin (B-12) 1000 MCG TBCR Take 1,000 mcg by mouth daily 100 tablet 1     [DISCONTINUED] insulin glargine (LANTUS) 100 UNIT/ML injection Inject 25 Units Subcutaneous At Bedtime       B-D U/F 31G X 8 MM insulin pen needle USE 2 TIMES DAILY OR AS DIRECTED 100 each 0     blood glucose monitoring (ACCU-CHEK SMARTVIEW) test strip To check glucose four times per day 100 each 3     blood glucose monitoring (ACCU-CHEK FASTCLIX) lancets Up to four lancets per day or as directed. 4 Box 0       ALLERGIES     Allergies   Allergen Reactions     Amoxicillin        PAST MEDICAL HISTORY:  Past Medical History:   Diagnosis Date     Encephalitis     from mosquito bite     Hyperlipidemia LDL goal < 100      Hypertension goal BP (blood pressure) < 140/90      Hypothyroid      Idiopathic peripheral neuropathy 2016    Evaluated by neuro and no etiology found.      Pericarditis late     viral infection     Type 2 diabetes, HbA1c goal < 7% (H)      Vitamin B12 deficiency     was recieving IM injections     Vitamin D deficiency        PAST SURGICAL HISTORY:  Past Surgical History:   Procedure Laterality Date     C AFO TIBIAL FRACTURE RIGID       HYSTERECTOMY, PAP NO LONGER INDICATED      adhesions and tumors     SLING BLADDER SUSPENSION WITH FASCIA ZARA         FAMILY HISTORY:  Family History   Problem Relation Age of Onset     Alzheimer Disease Mother            CANCER Father      skin     Suicide Father      2012     Anxiety Disorder Father      C.A.D. Maternal Grandmother       of MI age 54     Asthma Maternal Grandfather      DIABETES Sister      Hypertension Sister      Lipids Sister        SOCIAL HISTORY:  Social History     Social History     Marital status: Single     Spouse name: N/A     Number of children: N/A     Years of  "education: N/A     Social History Main Topics     Smoking status: Never Smoker     Smokeless tobacco: Never Used     Alcohol use Yes      Comment: 2 glasses of wine per year ( if that)     Drug use: No     Sexual activity: Not Currently     Other Topics Concern     Parent/Sibling W/ Cabg, Mi Or Angioplasty Before 65f 55m? No     Social History Narrative       Review of Systems:  Skin:  Negative     Eyes:  Negative    ENT:  Negative    Respiratory:  Negative    Cardiovascular:    Positive for  Gastroenterology: Negative    Genitourinary:  Negative    Musculoskeletal:  Positive for joint pain  Neurologic:  Negative    Psychiatric:  Negative    Heme/Lymph/Imm:  Positive for allergies  Endocrine:  Positive for thyroid disorder;diabetes    Physical Exam:  Vitals: /78  Pulse 83  Ht 1.702 m (5' 7\")  Wt 92.6 kg (204 lb 3.2 oz)  BMI 31.98 kg/m2    Constitutional:  cooperative, alert and oriented, well developed, well nourished, in no acute distress        Skin:  warm and dry to the touch        Head:  normocephalic        Eyes:  pupils equal and round        ENT:  no pallor or cyanosis        Neck:  carotid pulses are full and equal bilaterally;JVP normal        Chest:  normal breath sounds, clear to auscultation, normal A-P diameter, normal symmetry, normal respiratory excursion, no use of accessory muscles        Cardiac: regular rhythm, normal S1/S2, no S3 or S4, apical impulse not displaced, no murmurs, gallops or rubs                  Abdomen:  abdomen soft        Vascular:                                        Extremities and Back:  no deformities, clubbing, cyanosis, erythema observed;no edema        Neurological:  affect appropriate, oriented to time, person and place          Recent Lab Results:  LIPID RESULTS:  Lab Results   Component Value Date    CHOL 161 11/03/2015    HDL 40 (L) 11/03/2015    LDL 61 11/03/2015    TRIG 301 (H) 11/03/2015    CHOLHDLRATIO 4.0 11/03/2015       LIVER ENZYME RESULTS:  Lab " Results   Component Value Date    AST 7 08/25/2017    ALT 18 08/25/2017       CBC RESULTS:  Lab Results   Component Value Date    WBC 17.5 (H) 08/25/2017    RBC 3.88 08/25/2017    HGB 11.8 08/25/2017    HCT 34.7 (L) 08/25/2017    MCV 89 08/25/2017    MCH 30.4 08/25/2017    MCHC 34.0 08/25/2017    RDW 13.5 08/25/2017     08/25/2017       BMP RESULTS:  Lab Results   Component Value Date     08/25/2017    POTASSIUM 3.8 08/25/2017    CHLORIDE 100 08/25/2017    CO2 24 08/25/2017    ANIONGAP 11 08/25/2017     (H) 08/25/2017    BUN 30 08/25/2017    CR 1.05 (H) 08/25/2017    GFRESTIMATED 53 (L) 08/25/2017    GFRESTBLACK 64 08/25/2017    TRAE 8.7 08/25/2017        A1C RESULTS:  Lab Results   Component Value Date    A1C 8.7 (H) 05/16/2016       INR RESULTS:  No results found for: INR        CC  DO DELON Bills [facility]  [address] [place] [address]

## 2017-09-19 NOTE — MR AVS SNAPSHOT
After Visit Summary   9/19/2017    Meera Mcgarry    MRN: [id number]           Patient Information     Date Of Birth          1955        Visit Information        Provider Department      9/19/2017 11:00 AM Bessie Metcalf, GRAHAM POMPA [facility]        Today's Diagnoses     Hyperlipidemia with target LDL less than 100    -  1    Hypertension goal BP (blood pressure) < 140/90        Pericardial effusion          Care Instructions    Medication changes:  Reduce the ibuprofen to 2x per day for 3 days, then stop completely    Reduce the colchicine to 0.6 mg daily    With these changes, monitor for worsening symptoms and give us a call.    Call Natalie Yo about your concerns about your medical record. [phone]          Follow-ups after your visit        Additional Services     Follow-Up with Cardiologist                 Future tests that were ordered for you today     Open Future Orders        Priority Expected Expires Ordered    Follow-Up with Cardiologist Routine 11/6/2017 9/19/2018 9/19/2017            Who to contact     If you have questions or need follow up information about today's clinic visit or your schedule please contact [facility] directly at [phone].  Normal or non-critical lab and imaging results will be communicated to you by Publish2hart, letter or phone within 4 business days after the clinic has received the results. If you do not hear from us within 7 days, please contact the clinic through Publish2hart or phone. If you have a critical or abnormal lab result, we will notify you by phone as soon as possible.  Submit refill requests through [employer] or call your pharmacy and they will forward the refill request to us. Please allow 3 business days for your refill to be completed.          Additional Information About Your Visit        Publish2harAnagran Information     [employer] lets you send messages to your doctor,  "view your test results, renew your prescriptions, schedule appointments and more. To sign up, go to [email or web address] . Click on \"Log in\" on the left side of the screen, which will take you to the Welcome page. Then click on \"Sign up Now\" on the right side of the page.     You will be asked to enter the access code listed below, as well as some personal information. Please follow the directions to create your username and password.     Your access code is: [email or web address]  Expires: 2017  3:48 AM     Your access code will  in 90 days. If you need help or a new code, please call your [facility] clinic or [phone].        Care EveryWhere ID     This is your Care EveryWhere ID. This could be used by other organizations to access your [facility] medical records  [id number]        Your Vitals Were     Pulse Height BMI (Body Mass Index)             83 1.702 m (5' 7\") 31.98 kg/m2          Blood Pressure from Last 3 Encounters:   17 132/78   17 105/52   17 95/62    Weight from Last 3 Encounters:   17 92.6 kg (204 lb 3.2 oz)   17 95.5 kg (210 lb 8.6 oz)   17 95.3 kg (210 lb)                 Today's Medication Changes          These changes are accurate as of: 17 11:33 AM.  If you have any questions, ask your nurse or doctor.               These medicines have changed or have updated prescriptions.        Dose/Directions    insulin glargine 100 UNIT/ML injection   This may have changed:  Another medication with the same name was removed. Continue taking this medication, and follow the directions you see here.   Changed by:  Bessie Metcalf, APRN CNP        Take as directed   Refills:  0                Primary Care Provider Office Phone # Fax #    Angella Jones [phone]       [facility] [address] [address]        Equal Access to Services     FRANCISCO PATINO AH: alexus Jerome qaybta " yuan youngcatalino small ah. [facility] [phone].    ATENCIÓN: Si lety jean, tiene a nolasco disposición servicios gratuitos de asistencia lingüística. Nani al [phone].    We comply with applicable federal civil rights laws and [place] laws. We do not discriminate on the basis of race, color, national origin, age, disability sex, sexual orientation or gender identity.            Thank you!     Thank you for choosing [facility]  for your care. Our goal is always to provide you with excellent care. Hearing back from our patients is one way we can continue to improve our services. Please take a few minutes to complete the written survey that you may receive in the mail after your visit with us. Thank you!             Your Updated Medication List - Protect others around you: Learn how to safely use, store and throw away your medicines at www.disposemymeds.org.          This list is accurate as of: 9/19/17 11:33 AM.  Always use your most recent med list.                   Brand Name Dispense Instructions for use Diagnosis    aspirin 81 MG tablet      Take 81 mg by mouth daily        B-12 1000 MCG Tbcr     100 tablet    Take 1,000 mcg by mouth daily        B-D U/F 31G X 8 MM   Generic drug:  insulin pen needle     100 each    USE 2 TIMES DAILY OR AS DIRECTED    Uncomplicated type 2 diabetes mellitus (H)       blood glucose monitoring lancets     4 Box    Up to four lancets per day or as directed.    Type 2 diabetes mellitus without complication, without long-term current use of insulin (H)       blood glucose monitoring test strip    ACCU-CHEK SMARTVIEW    100 each    To check glucose four times per day    Type 2 diabetes mellitus without complication, without long-term current use of insulin (H)       cinnamon 500 MG Caps      Take 1 tablet by mouth daily        Colchicine 0.6 MG Caps     60 capsule    Take 0.6 mg by mouth 2 times daily     Acute idiopathic pericarditis       hydrochlorothiazide 25 MG tablet    HYDRODIURIL    90 tablet    Take 1 tablet (25 mg) by mouth daily    Benign essential hypertension       ibuprofen 800 MG tablet    ADVIL/MOTRIN    60 tablet    Take 1 tablet (800 mg) by mouth every 8 hours    Acute idiopathic pericarditis       ICAPS AREDS FORMULA PO      Take 1 tablet by mouth daily        insulin glargine 100 UNIT/ML injection      Take as directed        * LEVOTHYROXINE SODIUM PO      Take 175 mcg by mouth daily 1 tablet daily x 6 days weekly, then 1/2 tablet (87.5 mcg) on Sundays        * LEVOTHYROXINE SODIUM PO      Take 87.5 mcg by mouth once a week Takes 1/2 of 175mcg tablet once weekly on Sundays & 1 tablet 6 days weekly.        lisinopril 10 MG tablet    PRINIVIL/ZESTRIL    90 tablet    Take 1 tablet (10 mg) by mouth daily    Hypertension goal BP (blood pressure) < 140/90       VICTOZA PEN 18 MG/3ML soln   Generic drug:  liraglutide     27 mL    INJECT 1.8 MG UNDER THE SKIN DAILY    Uncomplicated type 2 diabetes mellitus (H)       * Notice:  This list has 2 medication(s) that are the same as other medications prescribed for you. Read the directions carefully, and ask your doctor or other care provider to review them with you.

## 2017-09-22 ENCOUNTER — TELEPHONE (OUTPATIENT)
Dept: CARDIOLOGY | Facility: CLINIC | Age: 62
End: 2017-09-22

## 2017-09-22 NOTE — TELEPHONE ENCOUNTER
----- Message from Regina Akhtar RN sent at 9/22/2017 10:52 AM CDT -----  Regarding: Please call pt  Can you please call this pt? She left a message for Bessie stating she has gone down hill since their visit on 9/19.     I deleted it before I realized she wasn't a [facility] pt.    Thanks!  Regina

## 2017-09-22 NOTE — TELEPHONE ENCOUNTER
Received call from pt stating that she hs been feeling worse since seeing Bessie Metcalf earlier this week. She states she felt fine on Wednesday, but Thursday she stets she felt awful. He has chest pain & chest pressure, it hurt to take a deep breath, she was running a fever up to 102.4, she was nauseated & vomited once. At 7 PM, symptoms resolved except back pain when she takes a deep breath. Today she feels fine except she continues to have back pain when she takes a deep breath. Pt states she continues taking ibuprofen 800 mg twice daily and Colchicine 0.6 MG twice daily. Pt advised to move up pt to see Dr. Machuca & scheduled to see him 10/5/17. Will message Bessie Metcalf CNP to review. LPenfield RN

## 2017-09-25 NOTE — TELEPHONE ENCOUNTER
Called pt to follow up & to review recommendation that she can increase ibuprofen to 800 mg TID. Pt is taking 800 BID right now. She states she got tired of setting her alarm to get up to take the third dose. Pt states she is symptom free today, was gradually feeling better over the weekend, yesterday felt good except very tired & she went to bed at 8 PM last night. Pt advised if she continues to feel better she could continue with ibuprofen BID but if symptoms worsen at all she should go back to three times daily and call back. Otherwise she will follow up with Dr. Machuca 10/5/17 as scheduled. LPenfield RN

## 2017-10-05 ENCOUNTER — OFFICE VISIT (OUTPATIENT)
Dept: CARDIOLOGY | Facility: CLINIC | Age: 62
End: 2017-10-05
Payer: COMMERCIAL

## 2017-10-05 VITALS
HEIGHT: 67 IN | DIASTOLIC BLOOD PRESSURE: 73 MMHG | SYSTOLIC BLOOD PRESSURE: 106 MMHG | WEIGHT: 190 LBS | BODY MASS INDEX: 29.82 KG/M2 | HEART RATE: 90 BPM

## 2017-10-05 DIAGNOSIS — I51.7 MILD LEFT VENTRICULAR HYPERTROPHY: Primary | ICD-10-CM

## 2017-10-05 DIAGNOSIS — I31.39 PERICARDIAL EFFUSION: ICD-10-CM

## 2017-10-05 PROCEDURE — 99213 OFFICE O/P EST LOW 20 MIN: CPT | Performed by: INTERNAL MEDICINE

## 2017-10-05 NOTE — PROGRESS NOTES
HISTORY OF PRESENT ILLNESS:  Had a recurrence after nurses visit, now fine.     Orders this Visit:  Orders Placed This Encounter   Procedures     Follow-Up with Cardiologist     No orders of the defined types were placed in this encounter.    There are no discontinued medications.    Encounter Diagnoses   Name Primary?     Mild left ventricular hypertrophy Yes     Pericardial effusion        CURRENT MEDICATIONS:  Current Outpatient Prescriptions   Medication Sig Dispense Refill     insulin glargine (BASAGLAR KWIKPEN) 100 UNIT/ML injection Take as directed       cinnamon 500 MG CAPS Take 1 tablet by mouth daily       ibuprofen (ADVIL/MOTRIN) 800 MG tablet Take 1 tablet (800 mg) by mouth every 8 hours (Patient taking differently: Take 800 mg by mouth 2 times daily ) 60 tablet 0     Colchicine 0.6 MG CAPS Take 0.6 mg by mouth 2 times daily 60 capsule 0     LEVOTHYROXINE SODIUM PO Take 175 mcg by mouth daily 1 tablet daily x 6 days weekly, then 1/2 tablet (87.5 mcg) on Sundays       LEVOTHYROXINE SODIUM PO Take 87.5 mcg by mouth once a week Takes 1/2 of 175mcg tablet once weekly on Sundays & 1 tablet 6 days weekly.       VICTOZA PEN 18 MG/3ML soln INJECT 1.8 MG UNDER THE SKIN DAILY 27 mL 0     hydrochlorothiazide (HYDRODIURIL) 25 MG tablet Take 1 tablet (25 mg) by mouth daily 90 tablet 0     lisinopril (PRINIVIL,ZESTRIL) 10 MG tablet Take 1 tablet (10 mg) by mouth daily 90 tablet 1     Multiple Vitamins-Minerals (ICAPS AREDS FORMULA PO) Take 1 tablet by mouth daily        aspirin 81 MG tablet Take 81 mg by mouth daily        B-D U/F 31G X 8 MM insulin pen needle USE 2 TIMES DAILY OR AS DIRECTED 100 each 0     blood glucose monitoring (ACCU-CHEK SMARTVIEW) test strip To check glucose four times per day 100 each 3     blood glucose monitoring (ACCU-CHEK FASTCLIX) lancets Up to four lancets per day or as directed. 4 Box 0     Cyanocobalamin (B-12) 1000 MCG TBCR Take 1,000 mcg by mouth daily 100 tablet 1       ALLERGIES    "  Allergies   Allergen Reactions     Amoxicillin        PAST MEDICAL, SURGICAL, FAMILY, SOCIAL HISTORY:  History was reviewed and updated as needed, see medical record.    Review of Systems:  A 12-point review of systems was completed, see medical record for detailed review of systems information.    Physical Exam:  Vitals: /73  Pulse 90  Ht 1.702 m (5' 7\")  Wt 86.2 kg (190 lb)  BMI 29.76 kg/m2    Constitutional:  cooperative, alert and oriented, well developed, well nourished, in no acute distress        Skin:  warm and dry to the touch, no apparent skin lesions or masses noted        Head:  normocephalic, no masses or lesions        Eyes:  pupils equal and round, conjunctivae and lids unremarkable, sclera white, no xanthalasma, EOMS intact, no nystagmus        ENT:  no pallor or cyanosis, dentition good        Neck:  carotid pulses are full and equal bilaterally, JVP normal, no carotid bruit, no thyromegaly        Chest:  normal breath sounds, clear to auscultation, normal A-P diameter, normal symmetry, normal respiratory excursion, no use of accessory muscles        Cardiac: regular rhythm, normal S1/S2, no S3 or S4, apical impulse not displaced, no murmurs, gallops or rubs                  Abdomen:           Vascular: pulses full and equal, no bruits auscultated                                      Extremities and Back:  no deformities, clubbing, cyanosis, erythema observed        Neurological:  affect appropriate, oriented to time, person and place        ASSESSMENT: recurrent pericarditis       RECOMMENDATIONS: continue colchicine and ibuprofen, will attempt to taper ibuprofen first, then colchicine to be on colchicine 0.6 daily at time of follow up. If we fail, may consider cardiac MRI      Recent Lab Results:  LIPID RESULTS:  Lab Results   Component Value Date    CHOL 161 11/03/2015    HDL 40 (L) 11/03/2015    LDL 61 11/03/2015    TRIG 301 (H) 11/03/2015    CHOLHDLRATIO 4.0 11/03/2015       LIVER " ENZYME RESULTS:  Lab Results   Component Value Date    AST 7 08/25/2017    ALT 18 08/25/2017       CBC RESULTS:  Lab Results   Component Value Date    WBC 17.5 (H) 08/25/2017    RBC 3.88 08/25/2017    HGB 11.8 08/25/2017    HCT 34.7 (L) 08/25/2017    MCV 89 08/25/2017    MCH 30.4 08/25/2017    MCHC 34.0 08/25/2017    RDW 13.5 08/25/2017     08/25/2017       BMP RESULTS:  Lab Results   Component Value Date     08/25/2017    POTASSIUM 3.8 08/25/2017    CHLORIDE 100 08/25/2017    CO2 24 08/25/2017    ANIONGAP 11 08/25/2017     (H) 08/25/2017    BUN 30 08/25/2017    CR 1.05 (H) 08/25/2017    GFRESTIMATED 53 (L) 08/25/2017    GFRESTBLACK 64 08/25/2017    TRAE 8.7 08/25/2017        A1C RESULTS:  Lab Results   Component Value Date    A1C 8.7 (H) 05/16/2016       INR RESULTS:  No results found for: INR    We greatly appreciate the opportunity to be involved in the care of your patient, Meera Mcgarry.    Sincerely,  Adrian Machuca MD      CC  No referring provider defined for this encounter.

## 2017-10-05 NOTE — LETTER
10/5/2017      RE: Meera Mcgarry  [address] [place] [address]       HISTORY OF PRESENT ILLNESS:  Had a recurrence after nurses visit, now fine.     Orders this Visit:  Orders Placed This Encounter   Procedures     Follow-Up with Cardiologist     No orders of the defined types were placed in this encounter.    There are no discontinued medications.    Encounter Diagnoses   Name Primary?     Mild left ventricular hypertrophy Yes     Pericardial effusion        CURRENT MEDICATIONS:  Current Outpatient Prescriptions   Medication Sig Dispense Refill     insulin glargine (BASAGLAR KWIKPEN) 100 UNIT/ML injection Take as directed       cinnamon 500 MG CAPS Take 1 tablet by mouth daily       ibuprofen (ADVIL/MOTRIN) 800 MG tablet Take 1 tablet (800 mg) by mouth every 8 hours (Patient taking differently: Take 800 mg by mouth 2 times daily ) 60 tablet 0     Colchicine 0.6 MG CAPS Take 0.6 mg by mouth 2 times daily 60 capsule 0     LEVOTHYROXINE SODIUM PO Take 175 mcg by mouth daily 1 tablet daily x 6 days weekly, then 1/2 tablet (87.5 mcg) on Sundays       LEVOTHYROXINE SODIUM PO Take 87.5 mcg by mouth once a week Takes 1/2 of 175mcg tablet once weekly on Sundays & 1 tablet 6 days weekly.       VICTOZA PEN 18 MG/3ML soln INJECT 1.8 MG UNDER THE SKIN DAILY 27 mL 0     hydrochlorothiazide (HYDRODIURIL) 25 MG tablet Take 1 tablet (25 mg) by mouth daily 90 tablet 0     lisinopril (PRINIVIL,ZESTRIL) 10 MG tablet Take 1 tablet (10 mg) by mouth daily 90 tablet 1     Multiple Vitamins-Minerals (ICAPS AREDS FORMULA PO) Take 1 tablet by mouth daily        aspirin 81 MG tablet Take 81 mg by mouth daily        B-D U/F 31G X 8 MM insulin pen needle USE 2 TIMES DAILY OR AS DIRECTED 100 each 0     blood glucose monitoring (ACCU-CHEK SMARTVIEW) test strip To check glucose four times per day 100 each 3     blood glucose monitoring (ACCU-CHEK FASTCLIX) lancets Up to four lancets per day or as directed. 4 Box 0     Cyanocobalamin  "(B-12) 1000 MCG TBCR Take 1,000 mcg by mouth daily 100 tablet 1       ALLERGIES     Allergies   Allergen Reactions     Amoxicillin        PAST MEDICAL, SURGICAL, FAMILY, SOCIAL HISTORY:  History was reviewed and updated as needed, see medical record.    Review of Systems:  A 12-point review of systems was completed, see medical record for detailed review of systems information.    Physical Exam:  Vitals: /73  Pulse 90  Ht 1.702 m (5' 7\")  Wt 86.2 kg (190 lb)  BMI 29.76 kg/m2    Constitutional:  cooperative, alert and oriented, well developed, well nourished, in no acute distress        Skin:  warm and dry to the touch, no apparent skin lesions or masses noted        Head:  normocephalic, no masses or lesions        Eyes:  pupils equal and round, conjunctivae and lids unremarkable, sclera white, no xanthalasma, EOMS intact, no nystagmus        ENT:  no pallor or cyanosis, dentition good        Neck:  carotid pulses are full and equal bilaterally, JVP normal, no carotid bruit, no thyromegaly        Chest:  normal breath sounds, clear to auscultation, normal A-P diameter, normal symmetry, normal respiratory excursion, no use of accessory muscles        Cardiac: regular rhythm, normal S1/S2, no S3 or S4, apical impulse not displaced, no murmurs, gallops or rubs                  Abdomen:           Vascular: pulses full and equal, no bruits auscultated                                      Extremities and Back:  no deformities, clubbing, cyanosis, erythema observed        Neurological:  affect appropriate, oriented to time, person and place        ASSESSMENT: recurrent pericarditis       RECOMMENDATIONS: continue colchicine and ibuprofen, will attempt to taper ibuprofen first, then colchicine to be on colchicine 0.6 daily at time of follow up. If we fail, may consider cardiac MRI      Recent Lab Results:  LIPID RESULTS:  Lab Results   Component Value Date    CHOL 161 11/03/2015    HDL 40 (L) 11/03/2015    LDL 61 " 11/03/2015    TRIG 301 (H) 11/03/2015    CHOLHDLRATIO 4.0 11/03/2015       LIVER ENZYME RESULTS:  Lab Results   Component Value Date    AST 7 08/25/2017    ALT 18 08/25/2017       CBC RESULTS:  Lab Results   Component Value Date    WBC 17.5 (H) 08/25/2017    RBC 3.88 08/25/2017    HGB 11.8 08/25/2017    HCT 34.7 (L) 08/25/2017    MCV 89 08/25/2017    MCH 30.4 08/25/2017    MCHC 34.0 08/25/2017    RDW 13.5 08/25/2017     08/25/2017       BMP RESULTS:  Lab Results   Component Value Date     08/25/2017    POTASSIUM 3.8 08/25/2017    CHLORIDE 100 08/25/2017    CO2 24 08/25/2017    ANIONGAP 11 08/25/2017     (H) 08/25/2017    BUN 30 08/25/2017    CR 1.05 (H) 08/25/2017    GFRESTIMATED 53 (L) 08/25/2017    GFRESTBLACK 64 08/25/2017    TRAE 8.7 08/25/2017        A1C RESULTS:  Lab Results   Component Value Date    A1C 8.7 (H) 05/16/2016       INR RESULTS:  No results found for: INR    We greatly appreciate the opportunity to be involved in the care of your patient, Meera Mcgarry.    Sincerely,  Adrian Machuca MD      CC  No referring provider defined for this encounter.                                                                       HISTORY OF PRESENT ILLNESS:  Meera Mcgarry, a 62-year-old woman with a history of recurrent idiopathic pericarditis was seen today at your request for followup.      The patient suffered from an episode of pericarditis in 1989 and had done well until 08/2017, when she presented with symptoms of fever, chills, inspiratory sharp chest pain and malaise.  An echo showed a small pericardial effusion, and a CRP was elevated at 53.5 and ESR was 59.      The patient was treated with a combination of colchicine and ibuprofen.  Followup was arranged with our nurse practitioner on 09/19/2017.  At that time, the patient was free of symptoms.  The patient was advised to start tapering off her ibuprofen and her colchicine.      The next day, however, the patient had  recurrence of symptoms exactly like which she experienced when she initially presented in August.  She elected not to come off of her medications, and her symptoms have now resolved.      The patient is planning a trip next week to go on a [place] .  She is completely free of symptoms and wants to travel.  There is no dyspnea, orthopnea or PND.  There has been no fever.  She denies any extracardiac involvement and specifically denied arthralgia, arthritis, skin rash or recurrent fever.      PHYSICAL EXAMINATION:   GENERAL:  Exam today demonstrates a very pleasant, cooperative and intelligent 62-year-old woman.   VITAL SIGNS:  Her blood pressure was 106/73, her heart rate is 90.   LUNGS:  Clear to percussion and auscultation.   CARDIOVASCULAR:  Shows a normal S1 with a normal S2.  There is no murmur, rub or click.  I do not hear any adventitious sounds.      LABORATORY STUDIES:  I reviewed the echo from 09/15/2017.      ASSESSMENT:  Ms. Mcgarry had a brief relapse of her acute pericarditis.  I believe the best course of therapy at this time is to continue her on colchicine and ibuprofen and again try to taper at some point in the future.  I would begin by trying to taper first off of ibuprofen and then get her down to a dose of colchicine 0.6 mg a day, with plans for a followup visit in about 3 months.  In the meantime, I believe she can travel safely.  She has been asked to contact me with any recurrent symptoms.  We may consider a cardiac MRI in the future if this episode does not resolve promptly.      RECOMMENDATIONS:   1.  The patient will begin by trying to taper off ibuprofen, going from 800 mg b.i.d. to 400 mg b.i.d., then 200 mg b.i.d. and then 200 mg daily, then stopping.  If she is able to taper off ibuprofen, then she will go from colchicine 0.6 b.i.d. to 0.6 daily until she sees me in December 2017.  We have a followup visit available in November 2017 should the patient fail her  taper.      We may consider a cardiac MRI in the future.      We greatly appreciate the opportunity to be involved in the care of this most pleasant woman.      cc:   Angella Jones DO   [place] [phone]         ADRIAN MACHUCA MD             D: 10/05/2017 14:52   T: 10/05/2017 19:43   MT: [id number]      Name:     GURPREET DUVAL   MRN:      [id number]        Account:      [id number]   :      1955           Service Date: 10/05/2017      Document: [id number]        Adrian Machuca MD

## 2017-10-05 NOTE — LETTER
10/5/2017    CHANTE RUCKER DO  [facility]   [address]  [facility] [address]    RE: Meera Mcgarry       Dear Colleague,    I had the pleasure of seeing Meera Mcgarry in the [facility].    Meera Mcgarry, a 62-year-old woman with a history of recurrent idiopathic pericarditis was seen today at your request for followup.      The patient suffered from an episode of pericarditis in 1989 and had done well until 08/2017, when she presented with symptoms of fever, chills, inspiratory sharp chest pain and malaise.  An echo showed a small pericardial effusion, and a CRP was elevated at 53.5 and ESR was 59.      The patient was treated with a combination of colchicine and ibuprofen.  Followup was arranged with our nurse practitioner on 09/19/2017.  At that time, the patient was free of symptoms.  The patient was advised to start tapering off her ibuprofen and her colchicine.      The next day, however, the patient had recurrence of symptoms exactly like which she experienced when she initially presented in August.  She elected not to come off of her medications, and her symptoms have now resolved.      The patient is planning a trip next week to go on a [place] cruise .  She is completely free of symptoms and wants to travel.  There is no dyspnea, orthopnea or PND.  There has been no fever.  She denies any extracardiac involvement and specifically denied arthralgia, arthritis, skin rash or recurrent fever.      PHYSICAL EXAMINATION:   GENERAL:  Exam today demonstrates a very pleasant, cooperative and intelligent 62-year-old woman.   VITAL SIGNS:  Her blood pressure was 106/73, her heart rate is 90.   LUNGS:  Clear to percussion and auscultation.   CARDIOVASCULAR:  Shows a normal S1 with a normal S2.  There is no murmur, rub or click.  I do not hear any adventitious sounds.      LABORATORY STUDIES:  I reviewed the echo from 09/15/2017.      Outpatient  Encounter Prescriptions as of 10/5/2017   Medication Sig Dispense Refill     insulin glargine (BASAGLAR KWIKPEN) 100 UNIT/ML injection Take as directed       cinnamon 500 MG CAPS Take 1 tablet by mouth daily       ibuprofen (ADVIL/MOTRIN) 800 MG tablet Take 1 tablet (800 mg) by mouth every 8 hours (Patient taking differently: Take 800 mg by mouth 2 times daily ) 60 tablet 0     Colchicine 0.6 MG CAPS Take 0.6 mg by mouth 2 times daily 60 capsule 0     LEVOTHYROXINE SODIUM PO Take 175 mcg by mouth daily 1 tablet daily x 6 days weekly, then 1/2 tablet (87.5 mcg) on Sundays       LEVOTHYROXINE SODIUM PO Take 87.5 mcg by mouth once a week Takes 1/2 of 175mcg tablet once weekly on Sundays & 1 tablet 6 days weekly.       VICTOZA PEN 18 MG/3ML soln INJECT 1.8 MG UNDER THE SKIN DAILY 27 mL 0     hydrochlorothiazide (HYDRODIURIL) 25 MG tablet Take 1 tablet (25 mg) by mouth daily 90 tablet 0     lisinopril (PRINIVIL,ZESTRIL) 10 MG tablet Take 1 tablet (10 mg) by mouth daily 90 tablet 1     Multiple Vitamins-Minerals (ICAPS AREDS FORMULA PO) Take 1 tablet by mouth daily        aspirin 81 MG tablet Take 81 mg by mouth daily        B-D U/F 31G X 8 MM insulin pen needle USE 2 TIMES DAILY OR AS DIRECTED 100 each 0     blood glucose monitoring (ACCU-CHEK SMARTVIEW) test strip To check glucose four times per day 100 each 3     blood glucose monitoring (ACCU-CHEK FASTCLIX) lancets Up to four lancets per day or as directed. 4 Box 0     Cyanocobalamin (B-12) 1000 MCG TBCR Take 1,000 mcg by mouth daily 100 tablet 1     No facility-administered encounter medications on file as of 10/5/2017.      ASSESSMENT:  Ms. Mcgarry had a brief relapse of her acute pericarditis.  I believe the best course of therapy at this time is to continue her on colchicine and ibuprofen and again try to taper at some point in the future.  I would begin by trying to taper first off of ibuprofen and then get her down to a dose of colchicine 0.6 mg a day, with  plans for a followup visit in about 3 months.  In the meantime, I believe she can travel safely.  She has been asked to contact me with any recurrent symptoms.  We may consider a cardiac MRI in the future if this episode does not resolve promptly.      RECOMMENDATIONS:   1.  The patient will begin by trying to taper off ibuprofen, going from 800 mg b.i.d. to 400 mg b.i.d., then 200 mg b.i.d. and then 200 mg daily, then stopping.  If she is able to taper off ibuprofen, then she will go from colchicine 0.6 b.i.d. to 0.6 daily until she sees me in December 2017.  We have a followup visit available in November 2017 should the patient fail her taper.      We may consider a cardiac MRI in the future.      We greatly appreciate the opportunity to be involved in the care of this most pleasant woman.     Sincerely,    Adrian Machuca MD     [facility]

## 2017-10-05 NOTE — MR AVS SNAPSHOT
After Visit Summary   10/5/2017    Meera Mcagrry    MRN: [id number]           Patient Information     Date Of Birth          1955        Visit Information        Provider Department      10/5/2017 2:15 PM Adrian Machuca MD [facility]        Today's Diagnoses     Mild left ventricular hypertrophy    -  1    Pericardial effusion           Follow-ups after your visit        Additional Services     Follow-Up with Cardiologist                 Your next 10 appointments already scheduled     Nov 13, 2017  9:45 AM CST   Return Visit with Adrian Machuca MD   [facility] ([facility])    [address] [place] [id number] [phone]              Future tests that were ordered for you today     Open Future Orders        Priority Expected Expires Ordered    Follow-Up with Cardiologist Routine 1/3/2018 10/5/2018 10/5/2017            Who to contact     If you have questions or need follow up information about today's clinic visit or your schedule please contact [facility] directly at [phone].  Normal or non-critical lab and imaging results will be communicated to you by AtlanteTrekhart, letter or phone within 4 business days after the clinic has received the results. If you do not hear from us within 7 days, please contact the clinic through AtlanteTrekhart or phone. If you have a critical or abnormal lab result, we will notify you by phone as soon as possible.  Submit refill requests through Qwaya or call your pharmacy and they will forward the refill request to us. Please allow 3 business days for your refill to be completed.          Additional Information About Your Visit        MyChart Information     Qwaya lets you send messages to your doctor, view your test results, renew your prescriptions, schedule appointments and more. To sign up, go to  [email or web address] . Click on \"Log in\" on the left side of the screen, which will take you to the Welcome page. Then click on \"Sign up Now\" on the right side of the page.     You will be asked to enter the access code listed below, as well as some personal information. Please follow the directions to create your username and password.     Your access code is: [email or web address]  Expires: 2017  3:48 AM     Your access code will  in 90 days. If you need help or a new code, please call your [facility] clinic or [phone].        Care EveryWhere ID     This is your Care EveryWhere ID. This could be used by other organizations to access your [facility] medical records  [id number]        Your Vitals Were     Pulse Height BMI (Body Mass Index)             90 1.702 m (5' 7\") 29.76 kg/m2          Blood Pressure from Last 3 Encounters:   10/05/17 106/73   17 132/78   17 105/52    Weight from Last 3 Encounters:   10/05/17 86.2 kg (190 lb)   17 92.6 kg (204 lb 3.2 oz)   17 95.5 kg (210 lb 8.6 oz)                 Today's Medication Changes          These changes are accurate as of: 10/5/17  2:43 PM.  If you have any questions, ask your nurse or doctor.               These medicines have changed or have updated prescriptions.        Dose/Directions    ibuprofen 800 MG tablet   Commonly known as:  ADVIL/MOTRIN   This may have changed:  when to take this   Used for:  Acute idiopathic pericarditis        Dose:  800 mg   Take 1 tablet (800 mg) by mouth every 8 hours   Quantity:  60 tablet   Refills:  0                Primary Care Provider Office Phone # Fax #    Angella ROMAIN Jones [phone]       [facility] [address] [facility] [address]        Equal Access to Services     FRANCISCO PATINO : Tracy Hernandez, alexus merida, yuan pineda. So [facility] [phone].    ATENCIÓN: Si lety jean, " tiene a nolasco disposición servicios gratuitos de asistencia lingüística. Nani rojas [phone].    We comply with applicable federal civil rights laws and [place] laws. We do not discriminate on the basis of race, color, national origin, age, disability, sex, sexual orientation, or gender identity.            Thank you!     Thank you for choosing [facility] [place]  for your care. Our goal is always to provide you with excellent care. Hearing back from our patients is one way we can continue to improve our services. Please take a few minutes to complete the written survey that you may receive in the mail after your visit with us. Thank you!             Your Updated Medication List - Protect others around you: Learn how to safely use, store and throw away your medicines at www.disposemymeds.org.          This list is accurate as of: 10/5/17  2:43 PM.  Always use your most recent med list.                   Brand Name Dispense Instructions for use Diagnosis    aspirin 81 MG tablet      Take 81 mg by mouth daily        B-12 1000 MCG Tbcr     100 tablet    Take 1,000 mcg by mouth daily        B-D U/F 31G X 8 MM   Generic drug:  insulin pen needle     100 each    USE 2 TIMES DAILY OR AS DIRECTED    Uncomplicated type 2 diabetes mellitus (H)       BASAGLAR 100 UNIT/ML injection      Take as directed        blood glucose monitoring lancets     4 Box    Up to four lancets per day or as directed.    Type 2 diabetes mellitus without complication, without long-term current use of insulin (H)       blood glucose monitoring test strip    ACCU-CHEK SMARTVIEW    100 each    To check glucose four times per day    Type 2 diabetes mellitus without complication, without long-term current use of insulin (H)       cinnamon 500 MG Caps      Take 1 tablet by mouth daily        Colchicine 0.6 MG Caps     60 capsule    Take 0.6 mg by mouth 2 times daily    Acute idiopathic pericarditis        hydrochlorothiazide 25 MG tablet    HYDRODIURIL    90 tablet    Take 1 tablet (25 mg) by mouth daily    Benign essential hypertension       ibuprofen 800 MG tablet    ADVIL/MOTRIN    60 tablet    Take 1 tablet (800 mg) by mouth every 8 hours    Acute idiopathic pericarditis       ICAPS AREDS FORMULA PO      Take 1 tablet by mouth daily        * LEVOTHYROXINE SODIUM PO      Take 175 mcg by mouth daily 1 tablet daily x 6 days weekly, then 1/2 tablet (87.5 mcg) on Sundays        * LEVOTHYROXINE SODIUM PO      Take 87.5 mcg by mouth once a week Takes 1/2 of 175mcg tablet once weekly on Sundays & 1 tablet 6 days weekly.        lisinopril 10 MG tablet    PRINIVIL/ZESTRIL    90 tablet    Take 1 tablet (10 mg) by mouth daily    Hypertension goal BP (blood pressure) < 140/90       VICTOZA PEN 18 MG/3ML soln   Generic drug:  liraglutide     27 mL    INJECT 1.8 MG UNDER THE SKIN DAILY    Uncomplicated type 2 diabetes mellitus (H)       * Notice:  This list has 2 medication(s) that are the same as other medications prescribed for you. Read the directions carefully, and ask your doctor or other care provider to review them with you.

## 2017-10-06 NOTE — PROGRESS NOTES
HISTORY OF PRESENT ILLNESS:  Meera Mcgarry, a 62-year-old woman with a history of recurrent idiopathic pericarditis was seen today at your request for followup.      The patient suffered from an episode of pericarditis in 1989 and had done well until 08/2017, when she presented with symptoms of fever, chills, inspiratory sharp chest pain and malaise.  An echo showed a small pericardial effusion, and a CRP was elevated at 53.5 and ESR was 59.      The patient was treated with a combination of colchicine and ibuprofen.  Followup was arranged with our nurse practitioner on 09/19/2017.  At that time, the patient was free of symptoms.  The patient was advised to start tapering off her ibuprofen and her colchicine.      The next day, however, the patient had recurrence of symptoms exactly like which she experienced when she initially presented in August.  She elected not to come off of her medications, and her symptoms have now resolved.      The patient is planning a trip next week to go on a [place] .  She is completely free of symptoms and wants to travel.  There is no dyspnea, orthopnea or PND.  There has been no fever.  She denies any extracardiac involvement and specifically denied arthralgia, arthritis, skin rash or recurrent fever.      PHYSICAL EXAMINATION:   GENERAL:  Exam today demonstrates a very pleasant, cooperative and intelligent 62-year-old woman.   VITAL SIGNS:  Her blood pressure was 106/73, her heart rate is 90.   LUNGS:  Clear to percussion and auscultation.   CARDIOVASCULAR:  Shows a normal S1 with a normal S2.  There is no murmur, rub or click.  I do not hear any adventitious sounds.      LABORATORY STUDIES:  I reviewed the echo from 09/15/2017.      ASSESSMENT:  Ms. Mcgarry had a brief relapse of her acute pericarditis.  I believe the best course of therapy at this time is to continue her on colchicine and ibuprofen and again try to taper at some point in the future.  I would  begin by trying to taper first off of ibuprofen and then get her down to a dose of colchicine 0.6 mg a day, with plans for a followup visit in about 3 months.  In the meantime, I believe she can travel safely.  She has been asked to contact me with any recurrent symptoms.  We may consider a cardiac MRI in the future if this episode does not resolve promptly.      RECOMMENDATIONS:   1.  The patient will begin by trying to taper off ibuprofen, going from 800 mg b.i.d. to 400 mg b.i.d., then 200 mg b.i.d. and then 200 mg daily, then stopping.  If she is able to taper off ibuprofen, then she will go from colchicine 0.6 b.i.d. to 0.6 daily until she sees me in 2017.  We have a followup visit available in 2017 should the patient fail her taper.      We may consider a cardiac MRI in the future.      We greatly appreciate the opportunity to be involved in the care of this most pleasant woman.      cc:   [place] [address]         VICTORIA NEWBERRY MD             D: 10/05/2017 14:52   T: 10/05/2017 19:43   MT: KRISSY      Name:     GURPREET DUVAL   MRN:      [id number]        Account:      [id number]   :      1955           Service Date: 10/05/2017      Document: [id number]

## 2017-11-13 ENCOUNTER — OFFICE VISIT (OUTPATIENT)
Dept: CARDIOLOGY | Facility: CLINIC | Age: 62
End: 2017-11-13
Attending: NURSE PRACTITIONER
Payer: COMMERCIAL

## 2017-11-13 VITALS
WEIGHT: 191 LBS | HEART RATE: 103 BPM | BODY MASS INDEX: 29.98 KG/M2 | SYSTOLIC BLOOD PRESSURE: 120 MMHG | DIASTOLIC BLOOD PRESSURE: 80 MMHG | HEIGHT: 67 IN

## 2017-11-13 DIAGNOSIS — E78.5 HYPERLIPIDEMIA WITH TARGET LDL LESS THAN 100: ICD-10-CM

## 2017-11-13 DIAGNOSIS — I10 HYPERTENSION GOAL BP (BLOOD PRESSURE) < 140/90: ICD-10-CM

## 2017-11-13 DIAGNOSIS — I31.39 PERICARDIAL EFFUSION: ICD-10-CM

## 2017-11-13 PROCEDURE — 99214 OFFICE O/P EST MOD 30 MIN: CPT | Performed by: INTERNAL MEDICINE

## 2017-11-13 RX ORDER — LEVOTHYROXINE SODIUM 175 UG/1
175 TABLET ORAL DAILY
COMMUNITY

## 2017-11-13 NOTE — PROGRESS NOTES
HISTORY OF PRESENT ILLNESS:  Occasional chest pressure.     Orders this Visit:  Orders Placed This Encounter   Procedures     Exercise Stress Echocardiogram     Orders Placed This Encounter   Medications     levothyroxine (SYNTHROID/LEVOTHROID) 175 MCG tablet     Sig: Take 175 mcg by mouth daily     Medications Discontinued During This Encounter   Medication Reason     LEVOTHYROXINE SODIUM PO Medication Reconciliation Clean Up       Encounter Diagnoses   Name Primary?     Hyperlipidemia with target LDL less than 100      Hypertension goal BP (blood pressure) < 140/90      Pericardial effusion        CURRENT MEDICATIONS:  Current Outpatient Prescriptions   Medication Sig Dispense Refill     levothyroxine (SYNTHROID/LEVOTHROID) 175 MCG tablet Take 175 mcg by mouth daily       insulin glargine (BASAGLAR KWIKPEN) 100 UNIT/ML injection Take as directed       cinnamon 500 MG CAPS Take 1 tablet by mouth daily       ibuprofen (ADVIL/MOTRIN) 800 MG tablet Take 1 tablet (800 mg) by mouth every 8 hours (Patient taking differently: Take 800 mg by mouth 2 times daily ) 60 tablet 0     Colchicine 0.6 MG CAPS Take 0.6 mg by mouth 2 times daily 60 capsule 0     LEVOTHYROXINE SODIUM PO Take 87.5 mcg by mouth once a week Takes 1/2 of 175mcg tablet once weekly on Sundays & 1 tablet 6 days weekly.       VICTOZA PEN 18 MG/3ML soln INJECT 1.8 MG UNDER THE SKIN DAILY 27 mL 0     B-D U/F 31G X 8 MM insulin pen needle USE 2 TIMES DAILY OR AS DIRECTED 100 each 0     hydrochlorothiazide (HYDRODIURIL) 25 MG tablet Take 1 tablet (25 mg) by mouth daily 90 tablet 0     blood glucose monitoring (ACCU-CHEK SMARTVIEW) test strip To check glucose four times per day 100 each 3     blood glucose monitoring (ACCU-CHEK FASTCLIX) lancets Up to four lancets per day or as directed. 4 Box 0     lisinopril (PRINIVIL,ZESTRIL) 10 MG tablet Take 1 tablet (10 mg) by mouth daily 90 tablet 1     Multiple Vitamins-Minerals (ICAPS AREDS FORMULA PO) Take 1 tablet by  "mouth daily        aspirin 81 MG tablet Take 81 mg by mouth daily        Cyanocobalamin (B-12) 1000 MCG TBCR Take 1,000 mcg by mouth daily 100 tablet 1       ALLERGIES     Allergies   Allergen Reactions     Amoxicillin        PAST MEDICAL, SURGICAL, FAMILY, SOCIAL HISTORY:  History was reviewed and updated as needed, see medical record.    Review of Systems:  A 12-point review of systems was completed, see medical record for detailed review of systems information.    Physical Exam:  Vitals: /80  Pulse 103  Ht 1.702 m (5' 7\")  Wt 86.6 kg (191 lb)  BMI 29.91 kg/m2    Constitutional:  cooperative, alert and oriented, well developed, well nourished, in no acute distress        Skin:  warm and dry to the touch, no apparent skin lesions or masses noted        Head:  normocephalic, no masses or lesions        Eyes:  pupils equal and round, conjunctivae and lids unremarkable, sclera white, no xanthalasma, EOMS intact, no nystagmus        ENT:           Neck:  carotid pulses are full and equal bilaterally, JVP normal, no carotid bruit        Chest:  normal breath sounds, clear to auscultation, normal A-P diameter, normal symmetry, normal respiratory excursion, no use of accessory muscles        Cardiac: regular rhythm, normal S1/S2, no S3 or S4, apical impulse not displaced, no murmurs, gallops or rubs                  Abdomen:  abdomen soft, non-tender, BS normoactive, no mass, no HSM, no bruits        Vascular:                                        Extremities and Back:  no deformities, clubbing, cyanosis, erythema observed        Neurological:  no gross motor deficits        ASSESSMENT: occasional chest pressure. Has cad risk factors.       RECOMMENDATIONS: stress echo   Follow up in one year      Recent Lab Results:  LIPID RESULTS:  Lab Results   Component Value Date    CHOL 161 11/03/2015    HDL 40 (L) 11/03/2015    LDL 61 11/03/2015    TRIG 301 (H) 11/03/2015    CHOLHDLRATIO 4.0 11/03/2015       LIVER ENZYME " RESULTS:  Lab Results   Component Value Date    AST 7 08/25/2017    ALT 18 08/25/2017       CBC RESULTS:  Lab Results   Component Value Date    WBC 17.5 (H) 08/25/2017    RBC 3.88 08/25/2017    HGB 11.8 08/25/2017    HCT 34.7 (L) 08/25/2017    MCV 89 08/25/2017    MCH 30.4 08/25/2017    MCHC 34.0 08/25/2017    RDW 13.5 08/25/2017     08/25/2017       BMP RESULTS:  Lab Results   Component Value Date     08/25/2017    POTASSIUM 3.8 08/25/2017    CHLORIDE 100 08/25/2017    CO2 24 08/25/2017    ANIONGAP 11 08/25/2017     (H) 08/25/2017    BUN 30 08/25/2017    CR 1.05 (H) 08/25/2017    GFRESTIMATED 53 (L) 08/25/2017    GFRESTBLACK 64 08/25/2017    TRAE 8.7 08/25/2017        A1C RESULTS:  Lab Results   Component Value Date    A1C 8.7 (H) 05/16/2016       INR RESULTS:  No results found for: INR    We greatly appreciate the opportunity to be involved in the care of your patient, Meera Mcgarry.    Sincerely,  Adrian Machuca MD      CC  Bessie Metcalf, APRN CNP  [address]  [place] [id number]

## 2017-11-13 NOTE — MR AVS SNAPSHOT
"              After Visit Summary   11/13/2017    Meera Mcgarry    MRN: [id number]           Patient Information     Date Of Birth          1955        Visit Information        Provider Department      11/13/2017 9:45 AM Adrian Machuca MD [facility]        Today's Diagnoses     Hyperlipidemia with target LDL less than 100        Hypertension goal BP (blood pressure) < 140/90        Pericardial effusion           Follow-ups after your visit        Additional Services     Follow-Up with Cardiologist                 Future tests that were ordered for you today     Open Future Orders        Priority Expected Expires Ordered    Follow-Up with Cardiologist Routine 11/13/2018 11/14/2018 11/13/2017    Exercise Stress Echocardiogram Routine 11/20/2017 11/13/2018 11/13/2017            Who to contact     If you have questions or need follow up information about today's clinic visit or your schedule please contact [facility] directly at [phone].  Normal or non-critical lab and imaging results will be communicated to you by Rewardlihart, letter or phone within 4 business days after the clinic has received the results. If you do not hear from us within 7 days, please contact the clinic through Rewardlihart or phone. If you have a critical or abnormal lab result, we will notify you by phone as soon as possible.  Submit refill requests through [employer] or call your pharmacy and they will forward the refill request to us. Please allow 3 business days for your refill to be completed.          Additional Information About Your Visit        RewardliharBlue Rooster Information     [employer] lets you send messages to your doctor, view your test results, renew your prescriptions, schedule appointments and more. To sign up, go to [email or web address] . Click on \"Log in\" on the left side of the screen, which will take you to the Welcome page. Then click on \"Sign up Now\" " "on the right side of the page.     You will be asked to enter the access code listed below, as well as some personal information. Please follow the directions to create your username and password.     Your access code is: [email or web address]  Expires: 2017  2:48 AM     Your access code will  in 90 days. If you need help or a new code, please call your [place] clinic or [phone].        Care EveryWhere ID     This is your [facility] EveryWhere ID. This could be used by other organizations to access your [place] medical records  [id number]        Your Vitals Were     Pulse Height BMI (Body Mass Index)             103 1.702 m (5' 7\") 29.91 kg/m2          Blood Pressure from Last 3 Encounters:   17 120/80   10/05/17 106/73   17 132/78    Weight from Last 3 Encounters:   17 86.6 kg (191 lb)   10/05/17 86.2 kg (190 lb)   17 92.6 kg (204 lb 3.2 oz)              We Performed the Following     Follow-Up with Cardiologist          Today's Medication Changes          These changes are accurate as of: 17 10:11 AM.  If you have any questions, ask your nurse or doctor.               Stop taking these medicines if you haven't already. Please contact your care team if you have questions.     Colchicine 0.6 MG Caps   Stopped by:  Adrian Machuca MD           ibuprofen 800 MG tablet   Commonly known as:  ADVIL/MOTRIN   Stopped by:  Adrian Machuca MD                    Primary Care Provider Office Phone # Fax #    Angella HOYOS Darrenmadeline  [phone]       [facility] [address] [facility] [address]        Equal Access to Services     [facility] HospitalRUTH ANN : Tracy Hernandez, wadonny lukieraadaha, qaybta kaalmada dane, yuan ramos. So [facility] [phone].    ATENCIÓN: Si habla español, tiene a nolasco disposición servicios gratuitos de asistencia lingüística. Llame al [phone].    We comply with applicable federal civil rights " laws and [place] laws. We do not discriminate on the basis of race, color, national origin, age, disability, sex, sexual orientation, or gender identity.            Thank you!     Thank you for choosing [facility]  for your care. Our goal is always to provide you with excellent care. Hearing back from our patients is one way we can continue to improve our services. Please take a few minutes to complete the written survey that you may receive in the mail after your visit with us. Thank you!             Your Updated Medication List - Protect others around you: Learn how to safely use, store and throw away your medicines at www.disposemymeds.org.          This list is accurate as of: 11/13/17 10:11 AM.  Always use your most recent med list.                   Brand Name Dispense Instructions for use Diagnosis    aspirin 81 MG tablet      Take 81 mg by mouth daily        B-12 1000 MCG Tbcr     100 tablet    Take 1,000 mcg by mouth daily        B-D U/F 31G X 8 MM   Generic drug:  insulin pen needle     100 each    USE 2 TIMES DAILY OR AS DIRECTED    Uncomplicated type 2 diabetes mellitus (H)       BASAGLAR 100 UNIT/ML injection      Take as directed        blood glucose monitoring lancets     4 Box    Up to four lancets per day or as directed.    Type 2 diabetes mellitus without complication, without long-term current use of insulin (H)       blood glucose monitoring test strip    ACCU-CHEK SMARTVIEW    100 each    To check glucose four times per day    Type 2 diabetes mellitus without complication, without long-term current use of insulin (H)       cinnamon 500 MG Caps      Take 1 tablet by mouth daily        hydrochlorothiazide 25 MG tablet    HYDRODIURIL    90 tablet    Take 1 tablet (25 mg) by mouth daily    Benign essential hypertension       ICAPS AREDS FORMULA PO      Take 1 tablet by mouth daily        * LEVOTHYROXINE SODIUM PO      Take 87.5 mcg by mouth once a week Takes  1/2 of 175mcg tablet once weekly on Sundays & 1 tablet 6 days weekly.        * levothyroxine 175 MCG tablet    SYNTHROID/LEVOTHROID     Take 175 mcg by mouth daily        lisinopril 10 MG tablet    PRINIVIL/ZESTRIL    90 tablet    Take 1 tablet (10 mg) by mouth daily    Hypertension goal BP (blood pressure) < 140/90       VICTOZA PEN 18 MG/3ML soln   Generic drug:  liraglutide     27 mL    INJECT 1.8 MG UNDER THE SKIN DAILY    Uncomplicated type 2 diabetes mellitus (H)       * Notice:  This list has 2 medication(s) that are the same as other medications prescribed for you. Read the directions carefully, and ask your doctor or other care provider to review them with you.

## 2017-11-13 NOTE — PROGRESS NOTES
HISTORY OF PRESENT ILLNESS:  Meera Mcgarry, a 62-year-old woman with a history of pericarditis, diabetes, hypertension and dyslipidemia was seen today at your request for followup.      The patient was admitted in 08/2017 with a several day history of inspiratory chest pain associated with a small pericardial effusion, a CRP of 53.5 and a ESR of 59.  Blood cultures were negative.  The patient was placed on a combination of colchicine and ibuprofen with a diagnosis of acute pericarditis.      I saw the patient in early October 2017, just prior [place]  and at that time, we discontinued her nonsteroidal anti-inflammatory drugs and continued the patient on colchicine 0.6 mg daily with plan for followup today.      Since last seen, she underwent an echocardiogram that has shown that her pericardial effusion has resolved.  She is free of sharp inspiratory chest pain.      While touring in [place], she had  3 separate  episodes of chest pressure during activity. The discomfort was unlike the symptoms she experienced during her episode of pericarditis. The recent chest  symptoms have not disabled or occurred in a prolonged fashion.      PAST MEDICAL HISTORY:   1.  History of acute pericarditis, symptoms resolved and echo normalized, tapering off colchicine.   2.  Hypertension.   3.  Diabetes mellitus.   4.  Dyslipidemia.   5.  osteoarthritis     PHYSICAL EXAMINATION:   GENERAL:  Exam today demonstrates a very pleasant, cooperative and intelligent 62-year-old woman who is moderately overweight.   VITAL SIGNS:  Her blood pressure is 120/80, heart rate is 95.   LUNGS:  Clear to percussion and auscultation.   CARDIOVASCULAR:  Shows a normal S1 with a normal S2, no S3.  There is no murmur, rub or click.      LABORATORY STUDIES:  I have reviewed the patient's most recent echocardiogram performed on 09/15/2017 and agree with the interpretation.      ASSESSMENT:  Ms. Mcgarry's pericarditis has  resolved.  At this point, I believe she can safely stop colchicine.  The patient was advised by her pharmacist to stop atorvastatin while she was on colchicine, although the interactions between these 2 drugs are very rare.  I believe the patient can safely resume atorvastatin as well.      I am uncertain as to the cause of the episodes of chest pressure  during her recent  tour,  , but in view of her risk factor profile, a stress echocardiogram would be reasonable to make certain there is no evidence of significant ischemia.  This test  also will allow us to evaluate her pericardium one more time.      RECOMMENDATIONS:   1.  Resume atorvastatin.   2.  Continue the remainder of the patient's excellent medical therapy.   3.  Maintain optimal systolic blood pressure and LDL cholesterol.   4.  Stress echocardiogram in the near future.  If the patient's stress echo was negative, I would not plan any further diagnostic testing.  We will plan to see the patient in about 1 year and followup at her request.      We greatly appreciate the opportunity to care for your patient, Gurpreet Duval.      cc:      [place] [id number]         VICTORIA NEWBERRY MD             D: 2017 10:19   T: 2017 11:41   MT: AVANI      Name:     GURPREET DUVAL   MRN:      [id number]        Account:      [id number]   :      1955           Service Date: 2017      Document: [id number]

## 2017-11-13 NOTE — LETTER
11/13/2017    CHANTE RUCKER DO  [facility]   [address]  [facility] [address]    RE: Meera Mcgarry       Dear Colleague,    I had the pleasure of seeing Meera Mcgarry in the [facility].    Meera Mcgarry, a 62-year-old woman with a history of pericarditis, diabetes, hypertension and dyslipidemia was seen today at your request for followup.      The patient was admitted in 08/2017 with a several day history of inspiratory chest pain associated with a small pericardial effusion, a CRP of 53.5 and a ESR of 59.  Blood cultures were negative.  The patient was placed on a combination of colchicine and ibuprofen with a diagnosis of acute pericarditis.      I saw the patient in early October 2017, just prior her [place]  and at that time, we discontinued her nonsteroidal anti-inflammatory drugs and continued the patient on colchicine 0.6 mg daily with plan for followup today.      Since last seen, she underwent an echocardiogram that has shown that her pericardial effusion has resolved.  She is free of sharp inspiratory chest pain.      While touring in [place], she had  3 separate  episodes of chest pressure during activity. The discomfort was unlike the symptoms she experienced during her episode of pericarditis. The recent chest  symptoms have not disabled or occurred in a prolonged fashion.      PAST MEDICAL HISTORY:   1.  History of acute pericarditis, symptoms resolved and echo normalized, tapering off colchicine.   2.  Hypertension.   3.  Diabetes mellitus.   4.  Dyslipidemia.   5.  osteoarthritis     PHYSICAL EXAMINATION:   GENERAL:  Exam today demonstrates a very pleasant, cooperative and intelligent 62-year-old woman who is moderately overweight.   VITAL SIGNS:  Her blood pressure is 120/80, heart rate is 95.   LUNGS:  Clear to percussion and auscultation.   CARDIOVASCULAR:  Shows a normal S1 with a normal S2, no S3.  There is no  murmur, rub or click.      LABORATORY STUDIES:  I have reviewed the patient's most recent echocardiogram performed on 09/15/2017 and agree with the interpretation.     Outpatient Encounter Prescriptions as of 11/13/2017   Medication Sig Dispense Refill     levothyroxine (SYNTHROID/LEVOTHROID) 175 MCG tablet Take 175 mcg by mouth daily       insulin glargine (BASAGLAR KWIKPEN) 100 UNIT/ML injection Take as directed       cinnamon 500 MG CAPS Take 1 tablet by mouth daily       LEVOTHYROXINE SODIUM PO Take 87.5 mcg by mouth once a week Takes 1/2 of 175mcg tablet once weekly on Sundays & 1 tablet 6 days weekly.       VICTOZA PEN 18 MG/3ML soln INJECT 1.8 MG UNDER THE SKIN DAILY 27 mL 0     B-D U/F 31G X 8 MM insulin pen needle USE 2 TIMES DAILY OR AS DIRECTED 100 each 0     hydrochlorothiazide (HYDRODIURIL) 25 MG tablet Take 1 tablet (25 mg) by mouth daily 90 tablet 0     blood glucose monitoring (ACCU-CHEK SMARTVIEW) test strip To check glucose four times per day 100 each 3     blood glucose monitoring (ACCU-CHEK FASTCLIX) lancets Up to four lancets per day or as directed. 4 Box 0     lisinopril (PRINIVIL,ZESTRIL) 10 MG tablet Take 1 tablet (10 mg) by mouth daily 90 tablet 1     Multiple Vitamins-Minerals (ICAPS AREDS FORMULA PO) Take 1 tablet by mouth daily        aspirin 81 MG tablet Take 81 mg by mouth daily        Cyanocobalamin (B-12) 1000 MCG TBCR Take 1,000 mcg by mouth daily 100 tablet 1     [DISCONTINUED] ibuprofen (ADVIL/MOTRIN) 800 MG tablet Take 1 tablet (800 mg) by mouth every 8 hours (Patient taking differently: Take 800 mg by mouth 2 times daily ) 60 tablet 0     [DISCONTINUED] Colchicine 0.6 MG CAPS Take 0.6 mg by mouth 2 times daily 60 capsule 0     [DISCONTINUED] LEVOTHYROXINE SODIUM PO Take 175 mcg by mouth daily 1 tablet daily x 6 days weekly, then 1/2 tablet (87.5 mcg) on Sundays       No facility-administered encounter medications on file as of 11/13/2017.         ASSESSMENT:  Ms. Mcgarry's  pericarditis has resolved.  At this point, I believe she can safely stop colchicine.  The patient was advised by her pharmacist to stop atorvastatin while she was on colchicine, although the interactions between these 2 drugs are very rare.  I believe the patient can safely resume atorvastatin as well.      I am uncertain as to the cause of the episodes of chest pressure  during her recent  tour,  , but in view of her risk factor profile, a stress echocardiogram would be reasonable to make certain there is no evidence of significant ischemia.  This test  also will allow us to evaluate her pericardium one more time.      RECOMMENDATIONS:   1.  Resume atorvastatin.   2.  Continue the remainder of the patient's excellent medical therapy.   3.  Maintain optimal systolic blood pressure and LDL cholesterol.   4.  Stress echocardiogram in the near future.  If the patient's stress echo was negative, I would not plan any further diagnostic testing.  We will plan to see the patient in about 1 year and followup at her request.      We greatly appreciate the opportunity to care for your patient, Meera Mcgarry.      Sincerely,    Adrian Machuca MD     [facility]

## 2017-12-13 ENCOUNTER — TELEPHONE (OUTPATIENT)
Dept: CARDIOLOGY | Facility: CLINIC | Age: 62
End: 2017-12-13

## 2017-12-13 DIAGNOSIS — Z86.79 HISTORY OF PERICARDITIS: Primary | ICD-10-CM

## 2017-12-13 DIAGNOSIS — I30.9 ACUTE PERICARDIAL EFFUSION: ICD-10-CM

## 2017-12-13 DIAGNOSIS — R07.9 CHEST PAIN: ICD-10-CM

## 2017-12-13 NOTE — TELEPHONE ENCOUNTER
"Patient called back, left VM stating that she can't exercise because she has a bad knee that is currently flaring up and she is planning to have a TKR done on it this Spring. She also states in her VM that she feels her chest pressure, which is constant, and fatigue and \"other symptoms\" are very similar to those she experienced with an episode of pericarditis 20+ years ago and also with her most recent episode this Fall. Will message Dr. Machuca to see if patient should have a lexiscan and a limited echo in place of the stress echo since patient can't go on the treadmill.     When patient calls back will assess further to see if she needs to come in for an OV.  "

## 2017-12-13 NOTE — TELEPHONE ENCOUNTER
Patient left another VM stating she is in a noisy location today and can't hear her phone ring but to leave a message. Called patient back, left VM with my call back number.

## 2017-12-13 NOTE — TELEPHONE ENCOUNTER
Patient left VM stating she has had some episodes of chest pressure and feels more fatigued. Patient states in VM that she wonders if her pericarditis is returning, after she was weaned off the colchicine and ibuprofen last month. Returned call, left detailed VM for patient that it looks like she mentioned chest pressure to Dr Machuca at last OV and he ordered a stress echo. Test isn't scheduled until 2/2018 so I told patient we should get that scheduled now, but I still wanted her to call back to discuss symptoms further. Call back number left.

## 2017-12-13 NOTE — TELEPHONE ENCOUNTER
Called patient to review below recommendation from Dr. Machuca and review symptoms. Patient denies that symptoms have worsened, states it is a dull chest pressure and low energy and states this is how all of her episodes of pericarditis have started. Denies orthopnea, SOB, dyspnea, arm/jaw pain or dizziness or lightheadedness. Asked patient to call us if symptoms worsen or change. In the meantime, patient agrees to undergo lexiscan, TTE, CRP & ESR labs. Transferred her to scheduling to make appointments for soonest available.

## 2017-12-13 NOTE — TELEPHONE ENCOUNTER
"chest pain  Received: Today       Brigette, MD Sandro Cardozo Danielle, RN Hi Danielle     I have reviewed my last note on her and at that time she said that she had experienced 3 episodes of chest pain on her  trip, different from her pericarditis symptoms. Her story sounds different now ( \"constant pain, dull, fatigue, like symptoms in August 2017) which suggests possible reactivation of her pericarditis as opposed to ischemia, but it is difficult to tell second hand from phone messages. It sounds to me as if we will need to investigate both possibilities.     I would agree we should do a lexiscan and limited echo as first step. I would also suggest a repeat CRP and ESR. After the blood tests are drawn, then I would like her to see if NSAIDs help her symptoms ( eg Ibuprofen 200 mg tid or Naproxen 250 mg bid), followed by a clinic visit to review the lexiscan, echo and blood test results and to see how she is doing in response to NSAIDS     Thanks Dr.M"

## 2020-07-16 ENCOUNTER — HOSPITAL ENCOUNTER (OUTPATIENT)
Dept: NUCLEAR MEDICINE | Facility: CLINIC | Age: 65
Setting detail: NUCLEAR MEDICINE
End: 2020-07-16
Attending: SPECIALIST
Payer: COMMERCIAL

## 2020-07-16 DIAGNOSIS — E21.3 HYPERPARATHYROIDISM (H): ICD-10-CM

## 2020-07-16 PROCEDURE — 78072 PARATHYRD PLANAR W/SPECT&CT: CPT

## 2020-07-16 PROCEDURE — 34300033 ZZH RX 343

## 2020-07-16 PROCEDURE — A9500 TC99M SESTAMIBI: HCPCS

## 2020-07-16 PROCEDURE — A9516 IODINE I-123 SOD IODIDE MIC: HCPCS

## 2020-07-16 RX ADMIN — Medication 27.4 MILLICURIE: at 10:05

## 2020-07-16 RX ADMIN — Medication 926 UCI.: at 08:05

## 2021-12-06 ENCOUNTER — APPOINTMENT (OUTPATIENT)
Dept: GENERAL RADIOLOGY | Facility: CLINIC | Age: 66
End: 2021-12-06
Attending: EMERGENCY MEDICINE
Payer: COMMERCIAL

## 2021-12-06 ENCOUNTER — APPOINTMENT (OUTPATIENT)
Dept: CARDIOLOGY | Facility: CLINIC | Age: 66
End: 2021-12-06
Attending: EMERGENCY MEDICINE
Payer: COMMERCIAL

## 2021-12-06 ENCOUNTER — HOSPITAL ENCOUNTER (EMERGENCY)
Facility: CLINIC | Age: 66
Discharge: HOME | End: 2021-12-06
Attending: EMERGENCY MEDICINE | Admitting: EMERGENCY MEDICINE
Payer: COMMERCIAL

## 2021-12-06 VITALS
DIASTOLIC BLOOD PRESSURE: 66 MMHG | WEIGHT: 199 LBS | BODY MASS INDEX: 31.23 KG/M2 | SYSTOLIC BLOOD PRESSURE: 116 MMHG | HEIGHT: 67 IN | RESPIRATION RATE: 12 BRPM | OXYGEN SATURATION: 98 % | HEART RATE: 66 BPM | TEMPERATURE: 97.1 F

## 2021-12-06 DIAGNOSIS — R07.9 CHEST PAIN, UNSPECIFIED TYPE: ICD-10-CM

## 2021-12-06 LAB
ALBUMIN SERPL-MCNC: 3.3 G/DL (ref 3.4–5)
ALP SERPL-CCNC: 94 U/L (ref 40–150)
ALT SERPL W P-5'-P-CCNC: 24 U/L (ref 0–50)
ANION GAP SERPL CALCULATED.3IONS-SCNC: 4 MMOL/L (ref 3–14)
AST SERPL W P-5'-P-CCNC: 10 U/L (ref 0–45)
ATRIAL RATE - MUSE: 77 BPM
BASOPHILS # BLD AUTO: 0 10E3/UL (ref 0–0.2)
BASOPHILS NFR BLD AUTO: 0 %
BILIRUB DIRECT SERPL-MCNC: <0.1 MG/DL (ref 0–0.2)
BILIRUB SERPL-MCNC: 0.2 MG/DL (ref 0.2–1.3)
BUN SERPL-MCNC: 31 MG/DL (ref 7–30)
CALCIUM SERPL-MCNC: 10.2 MG/DL (ref 8.5–10.1)
CHLORIDE BLD-SCNC: 110 MMOL/L (ref 94–109)
CO2 SERPL-SCNC: 23 MMOL/L (ref 20–32)
CREAT SERPL-MCNC: 0.93 MG/DL (ref 0.52–1.04)
CRP SERPL-MCNC: <2.9 MG/L (ref 0–8)
D DIMER PPP FEU-MCNC: 0.3 UG/ML FEU (ref 0–0.5)
DIASTOLIC BLOOD PRESSURE - MUSE: NORMAL MMHG
EOSINOPHIL # BLD AUTO: 0.1 10E3/UL (ref 0–0.7)
EOSINOPHIL NFR BLD AUTO: 1 %
ERYTHROCYTE [DISTWIDTH] IN BLOOD BY AUTOMATED COUNT: 13.2 % (ref 10–15)
ERYTHROCYTE [SEDIMENTATION RATE] IN BLOOD BY WESTERGREN METHOD: 15 MM/HR (ref 0–30)
GFR SERPL CREATININE-BSD FRML MDRD: 64 ML/MIN/1.73M2
GLUCOSE BLD-MCNC: 339 MG/DL (ref 70–99)
HCT VFR BLD AUTO: 41.7 % (ref 35–47)
HGB BLD-MCNC: 14.1 G/DL (ref 11.7–15.7)
HOLD SPECIMEN: NORMAL
IMM GRANULOCYTES # BLD: 0 10E3/UL
IMM GRANULOCYTES NFR BLD: 0 %
INTERPRETATION ECG - MUSE: NORMAL
KETONES BLD-SCNC: 0.1 MMOL/L (ref 0–0.6)
LVEF ECHO: NORMAL
LYMPHOCYTES # BLD AUTO: 2 10E3/UL (ref 0.8–5.3)
LYMPHOCYTES NFR BLD AUTO: 26 %
MAGNESIUM SERPL-MCNC: 2 MG/DL (ref 1.6–2.3)
MCH RBC QN AUTO: 30.8 PG (ref 26.5–33)
MCHC RBC AUTO-ENTMCNC: 33.8 G/DL (ref 31.5–36.5)
MCV RBC AUTO: 91 FL (ref 78–100)
MONOCYTES # BLD AUTO: 0.4 10E3/UL (ref 0–1.3)
MONOCYTES NFR BLD AUTO: 5 %
NEUTROPHILS # BLD AUTO: 5.1 10E3/UL (ref 1.6–8.3)
NEUTROPHILS NFR BLD AUTO: 68 %
NRBC # BLD AUTO: 0 10E3/UL
NRBC BLD AUTO-RTO: 0 /100
P AXIS - MUSE: 29 DEGREES
PLATELET # BLD AUTO: 203 10E3/UL (ref 150–450)
POTASSIUM BLD-SCNC: 5.1 MMOL/L (ref 3.4–5.3)
PR INTERVAL - MUSE: 158 MS
PROT SERPL-MCNC: 6.9 G/DL (ref 6.8–8.8)
QRS DURATION - MUSE: 86 MS
QT - MUSE: 382 MS
QTC - MUSE: 432 MS
R AXIS - MUSE: -21 DEGREES
RBC # BLD AUTO: 4.58 10E6/UL (ref 3.8–5.2)
SODIUM SERPL-SCNC: 137 MMOL/L (ref 133–144)
SYSTOLIC BLOOD PRESSURE - MUSE: NORMAL MMHG
T AXIS - MUSE: 27 DEGREES
TROPONIN I SERPL HS-MCNC: 5 NG/L
VENTRICULAR RATE- MUSE: 77 BPM
WBC # BLD AUTO: 7.6 10E3/UL (ref 4–11)

## 2021-12-06 PROCEDURE — 82010 KETONE BODYS QUAN: CPT | Performed by: EMERGENCY MEDICINE

## 2021-12-06 PROCEDURE — 82248 BILIRUBIN DIRECT: CPT | Performed by: EMERGENCY MEDICINE

## 2021-12-06 PROCEDURE — 83735 ASSAY OF MAGNESIUM: CPT | Performed by: EMERGENCY MEDICINE

## 2021-12-06 PROCEDURE — 96374 THER/PROPH/DIAG INJ IV PUSH: CPT | Mod: 59

## 2021-12-06 PROCEDURE — 99285 EMERGENCY DEPT VISIT HI MDM: CPT | Mod: 25

## 2021-12-06 PROCEDURE — 86140 C-REACTIVE PROTEIN: CPT | Performed by: EMERGENCY MEDICINE

## 2021-12-06 PROCEDURE — 999N000208 ECHOCARDIOGRAM COMPLETE

## 2021-12-06 PROCEDURE — 71046 X-RAY EXAM CHEST 2 VIEWS: CPT

## 2021-12-06 PROCEDURE — 85379 FIBRIN DEGRADATION QUANT: CPT | Performed by: EMERGENCY MEDICINE

## 2021-12-06 PROCEDURE — 80048 BASIC METABOLIC PNL TOTAL CA: CPT | Performed by: EMERGENCY MEDICINE

## 2021-12-06 PROCEDURE — 36415 COLL VENOUS BLD VENIPUNCTURE: CPT | Performed by: EMERGENCY MEDICINE

## 2021-12-06 PROCEDURE — 93306 TTE W/DOPPLER COMPLETE: CPT | Mod: 26 | Performed by: INTERNAL MEDICINE

## 2021-12-06 PROCEDURE — 93005 ELECTROCARDIOGRAM TRACING: CPT

## 2021-12-06 PROCEDURE — 84484 ASSAY OF TROPONIN QUANT: CPT | Performed by: EMERGENCY MEDICINE

## 2021-12-06 PROCEDURE — 255N000002 HC RX 255 OP 636: Performed by: EMERGENCY MEDICINE

## 2021-12-06 PROCEDURE — 85652 RBC SED RATE AUTOMATED: CPT | Performed by: EMERGENCY MEDICINE

## 2021-12-06 PROCEDURE — 85041 AUTOMATED RBC COUNT: CPT | Performed by: EMERGENCY MEDICINE

## 2021-12-06 RX ADMIN — HUMAN ALBUMIN MICROSPHERES AND PERFLUTREN 9 ML: 10; .22 INJECTION, SOLUTION INTRAVENOUS at 14:02

## 2021-12-06 ASSESSMENT — MIFFLIN-ST. JEOR: SCORE: 1475.29

## 2021-12-06 NOTE — ED PROVIDER NOTES
History     Chief Complaint:  Chest Pain       HPI   Meera Mcgarry is a 66 year old female with a history of pericarditis who came in for further evaluation of chest pain.  She has had pericarditis off and on for quite a few years and has been dealing with another episode of these last few months.  This morning she woke up with very sharp and significantly worse pain.  It does take her breath away, but she does not feel short of breath.  Her current pain does feel similar to her previous episodes of pericarditis, although again it became worse today.  She has not had a fever or cough or any other symptoms.  Of note, it is not worse with lying flat.  She got in touch with her cardiologist at the [facility], who recommended she come in for evaluation and an echocardiogram to rule out a pericardial effusion.    ROS:  Review of Systems   All other systems reviewed and are negative.         Allergies:  Amoxicillin  Other (Do Not Use)  Other Environmental Allergy  Sulfa Drugs  Chlorhexidine     Medications:    aspirin 81 MG tablet  B-D U/F 31G X 8 MM insulin pen needle  blood glucose monitoring (ACCU-CHEK FASTCLIX) lancets  blood glucose monitoring (ACCU-CHEK SMARTVIEW) test strip  cinnamon 500 MG CAPS  Cyanocobalamin (B-12) 1000 MCG TBCR  hydrochlorothiazide (HYDRODIURIL) 25 MG tablet  insulin glargine (BASAGLAR KWIKPEN) 100 UNIT/ML injection  levothyroxine (SYNTHROID/LEVOTHROID) 175 MCG tablet  LEVOTHYROXINE SODIUM PO  lisinopril (PRINIVIL,ZESTRIL) 10 MG tablet  Multiple Vitamins-Minerals (ICAPS AREDS FORMULA PO)  VICTOZA PEN 18 MG/3ML soln        Past Medical History:    Past Medical History:   Diagnosis Date     Encephalitis 1980     Hyperlipidemia LDL goal < 100      Hypertension goal BP (blood pressure) < 140/90      Hypothyroid      Idiopathic peripheral neuropathy 5/16/2016     Pericarditis late 1980s     Type 2 diabetes, HbA1c goal < 7% (H)      Vitamin B12 deficiency      Vitamin D deficiency      Patient  "Active Problem List   Diagnosis     Uncomplicated type 2 diabetes mellitus (H)     Hypothyroid     Hyperlipidemia with target LDL less than 100     Vitamin D deficiency     B12 deficiency anemia     Vitamin B12 deficiency     Hypertension goal BP (blood pressure) < 140/90     Mild left ventricular hypertrophy     Obesity BMI >35     History of pericarditis     Idiopathic peripheral neuropathy     Pericardial effusion        Past Surgical History:    Past Surgical History:   Procedure Laterality Date     C AFO TIBIAL FRACTURE RIGID  1980s     HYSTERECTOMY, PAP NO LONGER INDICATED  2000    adhesions and tumors     SLING BLADDER SUSPENSION WITH FASCIA ZARA  2007        Family History:    family history includes Alzheimer Disease in her mother; Anxiety Disorder in her father; Asthma in her maternal grandfather; C.A.D. in her maternal grandmother; Cancer in her father; Diabetes in her sister; Hypertension in her sister; Lipids in her sister; Suicide in her father.    Social History:   reports that she has never smoked. She has never used smokeless tobacco. She reports current alcohol use. She reports that she does not use drugs.  PCP: Angella Jones     Physical Exam     Patient Vitals for the past 24 hrs:   BP Temp Temp src Pulse Resp SpO2 Height Weight   12/06/21 1130 (!) 146/71 97.1  F (36.2  C) Oral 73 16 99 % 1.702 m (5' 7\") 90.3 kg (199 lb)        Physical Exam  Nursing note and vitals reviewed.  Constitutional:  Oriented to person, place, and time. Cooperative.   HENT:   Nose:    Nose normal.   Mouth/Throat:   Facemask in place.   Eyes:    Conjunctivae normal and EOM are normal.      Pupils are equal, round, and reactive to light.   Neck:    Trachea normal.   Cardiovascular:  Normal rate, regular rhythm, normal heart sounds and normal pulses. No murmur heard.  Pulmonary/Chest:  Effort normal and breath sounds normal.   Abdominal:   Soft. Normal appearance and bowel sounds are normal.      There is no " tenderness.      There is no rebound and no CVA tenderness.   Musculoskeletal:  Extremities atraumatic x 4.   Lymphadenopathy:  No cervical adenopathy.   Neurological:   Alert and oriented to person, place, and time. Normal strength.      No cranial nerve deficit or sensory deficit. GCS eye subscore is 4. GCS verbal subscore is 5. GCS motor subscore is 6.   Skin:    Skin is intact. No rash noted.   Psychiatric:   Normal mood and affect.      Emergency Department Course   ECG:  ECG  ECG taken at 1136, ECG read at 1142  No change as compared to prior, dated 08/25/2017.  Rate 77 bpm. NV interval 158 ms. QRS duration 86 ms. QT/QTc 382/432 ms. P-R-T axes 29 -21 27.       Imaging:  CXR:  Narrative & Impression   CHEST TWO VIEWS December 6, 2021 1:01 PM      HISTORY: Chest pain.     COMPARISON: 8/25/2017.                                                                      IMPRESSION: No acute cardiopulmonary disease.     Report per radiology    Laboratory:  Labs Ordered and Resulted from Time of ED Arrival to Time of ED Departure   BASIC METABOLIC PANEL - Abnormal       Result Value    Sodium 137      Potassium 5.1      Chloride 110 (*)     Carbon Dioxide (CO2) 23      Anion Gap 4      Urea Nitrogen 31 (*)     Creatinine 0.93      Calcium 10.2 (*)     Glucose 339 (*)     GFR Estimate 64     HEPATIC FUNCTION PANEL - Abnormal    Bilirubin Total 0.2      Bilirubin Direct <0.1      Protein Total 6.9      Albumin 3.3 (*)     Alkaline Phosphatase 94      AST 10      ALT 24     TROPONIN I - Normal    Troponin I High Sensitivity 5     MAGNESIUM - Normal    Magnesium 2.0     CRP INFLAMMATION - Normal    CRP Inflammation <2.9     ERYTHROCYTE SEDIMENTATION RATE AUTO - Normal    Erythrocyte Sedimentation Rate 15     KETONE BETA-HYDROXYBUTYRATE QUANTITATIVE, RAPID - Normal    Ketone (Beta-Hydroxybutyrate) Quantitative 0.1     D DIMER QUANTITATIVE - Normal    D-Dimer Quantitative 0.30     CBC WITH PLATELETS AND DIFFERENTIAL    WBC  Count 7.6      RBC Count 4.58      Hemoglobin 14.1      Hematocrit 41.7      MCV 91      MCH 30.8      MCHC 33.8      RDW 13.2      Platelet Count 203      % Neutrophils 68      % Lymphocytes 26      % Monocytes 5      % Eosinophils 1      % Basophils 0      % Immature Granulocytes 0      NRBCs per 100 WBC 0      Absolute Neutrophils 5.1      Absolute Lymphocytes 2.0      Absolute Monocytes 0.4      Absolute Eosinophils 0.1      Absolute Basophils 0.0      Absolute Immature Granulocytes 0.0      Absolute NRBCs 0.0          Emergency Department Course:  Reviewed:  I reviewed nursing notes, vitals, past medical history, Care Everywhere and [facility]    Interventions:  Medications   perflutren diluted 1mL to 2mL with saline (OPTISON) diluted injection 5 mL (9 mLs Intravenous Given 12/6/21 1402)   sodium chloride (PF) 0.9% PF flush 10 mL (20 mLs Intravenous Given 12/6/21 1402)        Disposition:  The patient was discharged to home.     Impression & Plan    Covid-19  Meera Mcgarry was evaluated during a global COVID-19 pandemic, which necessitated consideration that the patient might be at risk for infection with the SARS-CoV-2 virus that causes COVID-19.   Applicable protocols for evaluation were followed during the patient's care.   COVID-19 was considered as part of the patient's evaluation.     Medical Decision Making:  This is a 66-year-old female who came in for further evaluation of chest pain and concern for worsening pericarditis or possibly a pericardial effusion.  Other than being slightly hypertensive, her vital signs were normal, as well as her exam.  I felt it was reasonable to check the above blood work, EKG, and chest x-ray.  I also ordered an echocardiogram to see if she might have a pericardial effusion, and that was also unremarkable.  I then subsequently also checked a D-dimer to rule out a pulmonary embolism, and that also was negative.  I doubt this is cardiac ischemia given the history and her  work-up.  She also does not appear to have pericarditis at this time based on our findings today.  Therefore I feel that she is safe for discharge and outpatient management.  I recommended she get in touch with her cardiologist at the [facility] to discuss things further.  She knows to return with any concerns or worsening symptoms as well.    Diagnosis:    ICD-10-CM    1. Chest pain, unspecified type  R07.9         Discharge Medications:  Discharge Medication List as of 12/6/2021  3:51 PM           12/6/2021   Joseph Weber MD Lashkowitz, Seth H, MD  12/06/21 1742       Joseph Weber MD  12/30/21 1942

## 2021-12-06 NOTE — ED TRIAGE NOTES
Pericarditis related chest pain last few months, pain became more intense since yesterday. Had multiple episodes of pericarditis in the past.

## 2023-06-25 ENCOUNTER — TELEPHONE (OUTPATIENT)
Dept: OPHTHALMOLOGY | Facility: CLINIC | Age: 68
End: 2023-06-25
Payer: COMMERCIAL

## 2023-06-25 ENCOUNTER — HOSPITAL ENCOUNTER (EMERGENCY)
Facility: CLINIC | Age: 68
Discharge: HOME | End: 2023-06-25
Attending: EMERGENCY MEDICINE | Admitting: EMERGENCY MEDICINE
Payer: COMMERCIAL

## 2023-06-25 VITALS
TEMPERATURE: 97.7 F | WEIGHT: 193 LBS | RESPIRATION RATE: 20 BRPM | HEART RATE: 68 BPM | HEIGHT: 67 IN | SYSTOLIC BLOOD PRESSURE: 165 MMHG | DIASTOLIC BLOOD PRESSURE: 68 MMHG | BODY MASS INDEX: 30.29 KG/M2 | OXYGEN SATURATION: 100 %

## 2023-06-25 DIAGNOSIS — B00.52 KERATITIS, HERPETIC: ICD-10-CM

## 2023-06-25 PROCEDURE — 250N000009 HC RX 250

## 2023-06-25 PROCEDURE — 99284 EMERGENCY DEPT VISIT MOD MDM: CPT

## 2023-06-25 PROCEDURE — 250N000009 HC RX 250: Performed by: EMERGENCY MEDICINE

## 2023-06-25 RX ORDER — VALACYCLOVIR HYDROCHLORIDE 1 G/1
1000 TABLET, FILM COATED ORAL 3 TIMES DAILY
Qty: 21 TABLET | Refills: 0 | Status: SHIPPED | OUTPATIENT
Start: 2023-06-25 | End: 2023-07-02

## 2023-06-25 RX ORDER — MOXIFLOXACIN 5 MG/ML
1 SOLUTION/ DROPS OPHTHALMIC 3 TIMES DAILY
Qty: 1.1 ML | Refills: 0 | Status: SHIPPED | OUTPATIENT
Start: 2023-06-25 | End: 2023-07-02

## 2023-06-25 RX ORDER — PROPARACAINE HYDROCHLORIDE 5 MG/ML
SOLUTION/ DROPS OPHTHALMIC
Status: COMPLETED
Start: 2023-06-25 | End: 2023-06-25

## 2023-06-25 RX ORDER — KETOROLAC TROMETHAMINE 5 MG/ML
1 SOLUTION OPHTHALMIC 4 TIMES DAILY
Qty: 2 ML | Refills: 0 | Status: SHIPPED | OUTPATIENT
Start: 2023-06-25 | End: 2023-07-05

## 2023-06-25 RX ADMIN — PROPARACAINE HYDROCHLORIDE 2 DROP: 5 SOLUTION/ DROPS OPHTHALMIC at 11:49

## 2023-06-25 RX ADMIN — FLUORESCEIN SODIUM 1 STRIP: 1 STRIP OPHTHALMIC at 11:49

## 2023-06-25 ASSESSMENT — ACTIVITIES OF DAILY LIVING (ADL): ADLS_ACUITY_SCORE: 35

## 2023-06-25 ASSESSMENT — VISUAL ACUITY
OS: 20/80
OD: 20/100

## 2023-06-25 NOTE — ED TRIAGE NOTES
Right eye pain - started yesterday -  Blurry vision redness tearful   Denies any trauma   Pt wears glasses  Concern for corneal abrasion        Triage Assessment     Row Name 06/25/23 1129       Triage Assessment (Adult)    Airway WDL WDL       Respiratory WDL    Respiratory WDL WDL       Cardiac WDL    Cardiac WDL WDL       Cognitive/Neuro/Behavioral WDL    Cognitive/Neuro/Behavioral WDL WDL

## 2023-06-25 NOTE — ED PROVIDER NOTES
History     Chief Complaint:  Eye Pain       HPI   Meera Mcgarry is a 68 year old female who presents with concern of right eye pain.  This patient states yesterday she noted some eye discomfort affecting only the right eye when she woke yesterday.  The pain is worsened over time and she has increased tearing.  The eye has become red.  She is also noted the upper eyelid is slightly swollen.  She was wondering if she had a corneal abrasion as she had that in the past and the pain seems similar.  She states her right vision is blurry which she attributes to lots of tearing.  She denies purulent discharge from the eye.  She denies any distinct injury or exposure to the eye or being subject to a foreign body entering the eye.  Denies diplopia.  She states bright light will exacerbate the pain.  She has corrected vision for distance.  She is being assessed periodically for development of cataracts.  No history of eye surgery.  Patient also states that she has been diagnosed with dry eye in the past.  Was tested for Sjogren's disease but that was negative.  Periodically she uses topical treatments for dry eye which includes erythromycin ointment.  She had tried these treatments without relief.  She states when the nurse administered a drop of Alcaine to the right eye after she arrived to triage in the emergency department the pain resolved.  No other concerns.      Independent Historian:   None - Patient Only    Review of External Notes:   None        Medications:    ketorolac (ACULAR) 0.5 % ophthalmic solution  moxifloxacin (VIGAMOX) 0.5 % ophthalmic solution  valACYclovir (VALTREX) 1000 mg tablet  aspirin 81 MG tablet  B-D U/F 31G X 8 MM insulin pen needle  blood glucose monitoring (ACCU-CHEK FASTCLIX) lancets  blood glucose monitoring (ACCU-CHEK SMARTVIEW) test strip  cinnamon 500 MG CAPS  Cyanocobalamin (B-12) 1000 MCG TBCR  hydrochlorothiazide (HYDRODIURIL) 25 MG tablet  insulin glargine (BASAGLAR KWIKPEN) 100  "UNIT/ML injection  levothyroxine (SYNTHROID/LEVOTHROID) 175 MCG tablet  LEVOTHYROXINE SODIUM PO  lisinopril (PRINIVIL,ZESTRIL) 10 MG tablet  Multiple Vitamins-Minerals (ICAPS AREDS FORMULA PO)  VICTOZA PEN 18 MG/3ML soln        Past Medical History:    Past Medical History:   Diagnosis Date     Encephalitis 1980     Hyperlipidemia LDL goal < 100      Hypertension goal BP (blood pressure) < 140/90      Hypothyroid      Idiopathic peripheral neuropathy 5/16/2016     Pericarditis late 1980s     Type 2 diabetes, HbA1c goal < 7% (H)      Vitamin B12 deficiency      Vitamin D deficiency        Past Surgical History:    Past Surgical History:   Procedure Laterality Date     C AFO TIBIAL FRACTURE RIGID  1980s     HYSTERECTOMY, PAP NO LONGER INDICATED  2000    adhesions and tumors     SLING BLADDER SUSPENSION WITH FASCIA ZARA  2007        Physical Exam     Patient Vitals for the past 24 hrs:   BP Temp Temp src Pulse Resp SpO2 Height Weight   06/25/23 1132 (!) 165/68 97.7  F (36.5  C) Oral 68 20 100 % 1.702 m (5' 7\") 87.5 kg (193 lb)   06/25/23 1131 -- -- -- -- 18 -- -- --      Physical Exam  SKIN:  Warm, dry.  HEMATOLOGIC/IMMUNOLOGIC/LYMPHATIC:  No pallor.  HENT: No right periorbital erythema/swelling.  EYES:  Visual acuity: Right 20/100, left 20/80 - these measurements performed with uncorrected vision.  Normal extraocular motion.  Pupils equal round and reactive to light.  Right-sided conjunctival injection with tearing.  Fluorescein dye uptake of the right cornea at the 6 o'clock position which does not obscure the pupil.  The area of uptake is oval shaped without apparent ulceration.  Within the area of uptake there is a dendritic pattern.  Ocular pressures: Right side averages 10.  NEUROLOGIC:  Alert, conversant.  PSYCHIATRIC:  Normal mood and affect.    Emergency Department Course     Procedures   None    Emergency Department Course & Assessments:       Interventions:  Medications   proparacaine (ALCAINE) 0.5 % " ophthalmic solution (2 drops  $Given 6/25/23 1140)   fluorescein (FUL-AXEL) ophthalmic strip 1 strip (1 strip Right Eye $Given 6/25/23 1141)        Assessments:  History obtained, exam performed    Independent Interpretation (X-rays, CTs, rhythm strip):  None    Consultations/Discussion of Management or Tests:  Spoke to Dr. Long, Ophthalmologist     Social Determinants of Health affecting care:   None    Disposition:  The patient was discharged to home.          Medical Decision Making:  This patient presents with atraumatic right eye pain with vision impairment.  Considered a host of etiologies including infectious disease, acute angle glaucoma, corneal abrasion, herpetic keratitis and more.  Her ocular pressures as measured were normal so I believe this effectively rules out acute angle glaucoma.  The eye is red on the there is fluorescein dye uptake and also suggestion of dendritic pattern within the dye uptake over the cornea.  Raising concern for herpetic keratitis.  Given this concern I spoke with the on-call ophthalmologist for the [facility].  He advised the patient could be seen tomorrow morning in the clinic, he made an appointment for her.  Otherwise advised to start Vigamox as an antibiotic eyedrop and to consider Valtrex with concern of herpetic keratitis.  I did prescribe both these medications in addition to ocular Toradol.  I advised to visit the [place] emergency department if she is worsening over time pending her appointment tomorrow morning.  Unfortunately here at [facility] we do not have ophthalmology to assess patients in the emergency department.      Diagnosis:    ICD-10-CM    1. Keratitis, herpetic  B00.52            Discharge Medications:  Discharge Medication List as of 6/25/2023  1:00 PM      START taking these medications    Details   ketorolac (ACULAR) 0.5 % ophthalmic solution Place 1 drop into the right eye 4 times daily for 10 days, Disp-2 mL, R-0, E-Prescribe       moxifloxacin (VIGAMOX) 0.5 % ophthalmic solution Place 1 drop into the right eye 3 times daily for 7 days, Disp-1.1 mL, R-0, E-Prescribe      valACYclovir (VALTREX) 1000 mg tablet Take 1 tablet (1,000 mg) by mouth 3 times daily for 7 days, Disp-21 tablet, R-0, E-Prescribe                6/25/2023   Clark Velasquez MD Moe, James Thomas, MD  06/25/23 0030

## 2023-06-25 NOTE — TELEPHONE ENCOUNTER
Spoke with Dr. Velasquez about this patient. One day of right eye pain. Provider had concern for corneal abrasion vs herpes keratitis. No significant changes to their VA. IOP wnl. PERRL. The provider requested close follow up in our clinic. I emphasized that if there are any acute concerns that the patient should be sent to our emergency department for evaluation. However provider felt comfortable with his plan for management and follow up in clinic. We will schedule this from our end and contact the patient. Address to clinic provided to ED provider.     Kody Goff M.D.  PGY-2 Resident Physician  Department of Ophthalmology

## 2023-06-26 ENCOUNTER — TELEPHONE (OUTPATIENT)
Dept: OPHTHALMOLOGY | Facility: CLINIC | Age: 68
End: 2023-06-26
Payer: COMMERCIAL

## 2023-06-26 ENCOUNTER — OFFICE VISIT (OUTPATIENT)
Dept: OPHTHALMOLOGY | Facility: CLINIC | Age: 68
End: 2023-06-26
Attending: STUDENT IN AN ORGANIZED HEALTH CARE EDUCATION/TRAINING PROGRAM
Payer: COMMERCIAL

## 2023-06-26 DIAGNOSIS — S05.01XA ABRASION OF RIGHT CORNEA, INITIAL ENCOUNTER: Primary | ICD-10-CM

## 2023-06-26 PROCEDURE — G0463 HOSPITAL OUTPT CLINIC VISIT: HCPCS | Performed by: STUDENT IN AN ORGANIZED HEALTH CARE EDUCATION/TRAINING PROGRAM

## 2023-06-26 PROCEDURE — 99204 OFFICE O/P NEW MOD 45 MIN: CPT | Mod: GC | Performed by: STUDENT IN AN ORGANIZED HEALTH CARE EDUCATION/TRAINING PROGRAM

## 2023-06-26 RX ORDER — ERYTHROMYCIN 5 MG/G
0.5 OINTMENT OPHTHALMIC AT BEDTIME
Qty: 3.5 G | Refills: 3 | Status: SHIPPED | OUTPATIENT
Start: 2023-06-26

## 2023-06-26 ASSESSMENT — CONF VISUAL FIELD
OS_SUPERIOR_NASAL_RESTRICTION: 0
OD_INFERIOR_NASAL_RESTRICTION: 0
OD_NORMAL: 1
METHOD: COUNTING FINGERS
OD_INFERIOR_TEMPORAL_RESTRICTION: 0
OD_SUPERIOR_NASAL_RESTRICTION: 0
OS_NORMAL: 1
OS_INFERIOR_NASAL_RESTRICTION: 0
OS_SUPERIOR_TEMPORAL_RESTRICTION: 0
OD_SUPERIOR_TEMPORAL_RESTRICTION: 0
OS_INFERIOR_TEMPORAL_RESTRICTION: 0

## 2023-06-26 ASSESSMENT — VISUAL ACUITY
OS_PH_SC: 20/30
OS_SC+: +2
OD_PH_SC: 20/125
METHOD: SNELLEN - LINEAR
OD_SC: 20/150
OD_PH_SC+: +1
OS_SC: 20/70

## 2023-06-26 ASSESSMENT — SLIT LAMP EXAM - LIDS: COMMENTS: NORMAL

## 2023-06-26 ASSESSMENT — TONOMETRY
OS_IOP_MMHG: 14
OD_IOP_MMHG: 16
IOP_METHOD: TONOPEN

## 2023-06-26 ASSESSMENT — EXTERNAL EXAM - LEFT EYE: OS_EXAM: NORMAL

## 2023-06-26 ASSESSMENT — EXTERNAL EXAM - RIGHT EYE: OD_EXAM: NORMAL

## 2023-06-26 NOTE — PROGRESS NOTES
HPI     Eye Pain Right Eye      In right eye.  Characterized as foreign body sensation.  Pain was noted as 8/10.  Duration of 2 days.  Since onset it is stable.  Associated symptoms include blurred vision, foreign body sensation, redness, photophobia, tearing and swelling.  Negative for discharge.  Treatments tried include eye drops. Additional comments: Right eye pain.           Comments    Started 6/24 with right eye pain that increased Sunday 6/25.  Intense stabbing pain and scratchy feeling.  Stable blood sugars per patient.  Lab Results       Component                Value               Date                       A1C                      8.7                 05/16/2016                 A1C                      8.2                 11/03/2015                 A1C                      8.0                 08/06/2015                 A1C                      8.5                 05/12/2015                 A1C                      8.4                 02/24/2015              History of : EARLE, scratched cornea right eye, macular drusen  Eye meds: vigamox right eye, valcyclovir PO, ketrolac right eye, rosalba tyler each eye, EES tyler each eye     BIBI Coello 6/26/2023 8:26 AM            Last edited by Sandra Lezama CO on 6/26/2023  8:33 AM.          Ophthalmology Acute Clinic       HPI:   Meera Mcgarry is a 68 year old female who presents for evaluation of right eye pain and blurry vision for the past two days.     The patient states that in the early afternoon Saturday she has a scratchy feeling in her right eye. Slowly began worsening, and was associated with blurry vision and tearing. Sunday went to [facility] ED, who had concern for corneal abrasion vs herpes keratitis. They started the patient on Vigamox and oral Valtrex, and requested follow up today.     Since yesterday things have improved, but still painful, tearing, and blurry.     She denies a history of cold sores. She has a history of chicken pox, but  has received her shingles vaccine.     Past Ocular history:   - Glasses: Glasses for distance  - Contact lens wear: No   - Ocular medical history: AMD, dry eye, myopia  - Ocular surgical history: None  - Current eye drops: Wilfredo ointment at bedtime, some erythromycin, PFAT     PMH:   Past Medical History:   Diagnosis Date     Encephalitis 1980    from mosquito bite     Hyperlipidemia LDL goal < 100      Hypertension goal BP (blood pressure) < 140/90      Hypothyroid      Idiopathic peripheral neuropathy 5/16/2016    Evaluated by neuro and no etiology found.      Pericarditis late 1980s    viral infection     Type 2 diabetes, HbA1c goal < 7% (H)      Vitamin B12 deficiency     was recieving IM injections     Vitamin D deficiency          FH: No family history of glaucoma, AMD, or other ocular disease      Review of systems for the eyes was negative other than the pertinent positives/negatives listed in the HPI.        Assessment & Plan      Meera Mcgarry is a 68 year old female with the following diagnoses:     # Right eye corneal abrasion    - Two days of right eye pain, tearing and blurry vision  - VA 20/125 right eye 20/30 left eye   - IOP wnl   - Exam shows an inferior 3.5mmW x 1.2mmH corneal abrasion with a small additional abrasion just temporal to it. In addition there are D-folds extending centrally. There abrasion is located in the interpalpebral fissure, but there is no apparent lagophthalmos or significant lid laxity. There are no dendrites. AC is quiet. No hypopyon or hyphema.    - Fundus exam consistent with history of AMD, otherwise unremarkable.   - Will plan to treat abrasion with antibiotic eye drops and aggressive lubrication. Her decreased VA in the right eye may be a combination of her corneal edema, cataracts, and underlying AMD. I suspect this will improve as the abrasion heals.     Plan:  - Check corneal sensation prior to proparacaine administration at next visit   - Continue Vigamox QID  -  Stop Valtrex    - Patient would like to follow up at [facility] as this is where she gets her care. If she is unable to make a follow up appointment within a week, we requested she reaach out to our office and we are happy to see her here.   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 with Dr. Moreno for consideration of removal     Follow up:  Return in about 4 days (around 6/30/2023) for VT.      Patient seen with Dr. Luis     Thank you for entrusting us with your care,    Kody Goff M.D.  PGY-2 Resident Physician  Department of Ophthalmology  06/26/23 9:08 AM    Attending Physician Attestation:  Complete documentation of historical and exam elements from today's encounter can be found in the full encounter summary report (not reduplicated in this progress note).  I personally obtained the chief complaint(s) and history of present illness.  I confirmed and edited as necessary the review of systems, past medical/surgical history, family history, social history, and examination findings as documented by others; and I examined the patient myself.  I personally reviewed the relevant tests, images, and reports as documented above.  I formulated and edited as necessary the assessment and plan and discussed the findings and management plan with the patient and family. - Inder Luis MD

## 2023-06-27 ENCOUNTER — TELEPHONE (OUTPATIENT)
Dept: OPHTHALMOLOGY | Facility: CLINIC | Age: 68
End: 2023-06-27
Payer: COMMERCIAL

## 2023-06-27 NOTE — TELEPHONE ENCOUNTER
Spoke to pt at 1120    Pt with more pain/irritation and tearing following eye exam yesterday and thru night.    This morning some improvement    Pt states moxifloxacin burning    Pt just received the genteal gel drops today.    Recommended preservative free tears every 1-2 hours-- e.g refresh plus and may use the gel drops 3-4/day and ok to use the erythromycin ointment at night prescribed    Reviewed may try cool/refridgerated preservative free tear 5 minutes before moxifloxacin to see if improves burning.    Reviewed symptoms should continue improve.    Reviewed to call clinic tomorrow if symptoms not improving/worsening.    Pt has f/u appt on Friday at [facility].    Pt seemed comfortable with plan    Note to Dr. Goff for review/amendment if applicable.    Candelario Randall RN 11:29 AM 06/27/23              [facility] Call Center    Phone Message    May a detailed message be left on voicemail: yes     Reason for Call: Other: Pt was in to see Ziyad Goff and Toby yesterday. She states that her pain intensified yesterday afternoon and into the evening. It is still more painful this morning than it was yesterday at her Appt. She also states that the eyedrops she was prescribed burn when she puts them in. She does have an Appt with her eye doctor at [facility] on Friday, and she is wondering if she's ok waiting until then. Please call pt to discuss. Thank you.     Action Taken: Message routed to:  [facility] EYE    Travel Screening: Not Applicable

## 2025-05-27 ENCOUNTER — TRANSCRIBE ORDERS (OUTPATIENT)
Dept: OTHER | Age: 70
End: 2025-05-27

## 2025-05-27 DIAGNOSIS — R94.31 ABNORMAL ELECTROCARDIOGRAM: Primary | ICD-10-CM

## 2025-05-28 ENCOUNTER — PATIENT OUTREACH (OUTPATIENT)
Dept: CARE COORDINATION | Facility: CLINIC | Age: 70
End: 2025-05-28
Payer: COMMERCIAL